# Patient Record
Sex: FEMALE | Race: WHITE | NOT HISPANIC OR LATINO
[De-identification: names, ages, dates, MRNs, and addresses within clinical notes are randomized per-mention and may not be internally consistent; named-entity substitution may affect disease eponyms.]

---

## 2017-02-15 ENCOUNTER — APPOINTMENT (OUTPATIENT)
Dept: HEMATOLOGY ONCOLOGY | Facility: CLINIC | Age: 67
End: 2017-02-15

## 2017-02-15 ENCOUNTER — OUTPATIENT (OUTPATIENT)
Dept: OUTPATIENT SERVICES | Facility: HOSPITAL | Age: 67
LOS: 1 days | Discharge: HOME | End: 2017-02-15

## 2017-02-15 VITALS
WEIGHT: 154 LBS | HEIGHT: 59 IN | SYSTOLIC BLOOD PRESSURE: 147 MMHG | TEMPERATURE: 97.6 F | HEART RATE: 74 BPM | RESPIRATION RATE: 14 BRPM | BODY MASS INDEX: 31.04 KG/M2 | DIASTOLIC BLOOD PRESSURE: 79 MMHG

## 2017-03-13 ENCOUNTER — RECORD ABSTRACTING (OUTPATIENT)
Age: 67
End: 2017-03-13

## 2017-03-13 DIAGNOSIS — Z86.018 PERSONAL HISTORY OF OTHER BENIGN NEOPLASM: ICD-10-CM

## 2017-03-13 DIAGNOSIS — Z86.79 PERSONAL HISTORY OF OTHER DISEASES OF THE CIRCULATORY SYSTEM: ICD-10-CM

## 2017-03-13 DIAGNOSIS — Z80.3 FAMILY HISTORY OF MALIGNANT NEOPLASM OF BREAST: ICD-10-CM

## 2017-03-13 DIAGNOSIS — Z87.891 PERSONAL HISTORY OF NICOTINE DEPENDENCE: ICD-10-CM

## 2017-03-13 DIAGNOSIS — Z92.89 PERSONAL HISTORY OF OTHER MEDICAL TREATMENT: ICD-10-CM

## 2017-03-20 LAB
25(OH)D3 SERPL-MCNC: 16 NG/ML
ALBUMIN SERPL-MCNC: 4.5 G/DL
ALBUMIN/GLOB SERPL: 1.88
ALP SERPL-CCNC: 82 IU/L
ALT SERPL-CCNC: 29 IU/L
ANION GAP SERPL CALC-SCNC: 9 MEQ/L
AST SERPL-CCNC: 21 IU/L
BASOPHILS # BLD: 0.05 TH/MM3
BASOPHILS NFR BLD: 0.9 %
BILIRUB SERPL-MCNC: 0.5 MG/DL
BUN SERPL-MCNC: 14 MG/DL
BUN/CREAT SERPL: 28 %
CALCIUM SERPL-MCNC: 9.8 MG/DL
CHLORIDE SERPL-SCNC: 105 MEQ/L
CO2 SERPL-SCNC: 28 MEQ/L
CREAT SERPL-MCNC: 0.5 MG/DL
EOSINOPHIL # BLD: 0.28 TH/MM3
EOSINOPHIL NFR BLD: 4.9 %
ERYTHROCYTE [DISTWIDTH] IN BLOOD BY AUTOMATED COUNT: 14.6 %
GFR SERPL CREATININE-BSD FRML MDRD: 123
GLUCOSE SERPL-MCNC: 63 MG/DL
GRANULOCYTES # BLD: 2.77 TH/MM3
GRANULOCYTES NFR BLD: 48.7 %
HCT VFR BLD AUTO: 35.8 %
HGB BLD-MCNC: 11.6 G/DL
IMM GRANULOCYTES # BLD: 0.01 TH/MM3
IMM GRANULOCYTES NFR BLD: 0.2 %
LYMPHOCYTES # BLD: 2.13 TH/MM3
LYMPHOCYTES NFR BLD: 37.4 %
MCH RBC QN AUTO: 27.6 PG
MCHC RBC AUTO-ENTMCNC: 32.4 G/DL
MCV RBC AUTO: 85 FL
MONOCYTES # BLD: 0.45 TH/MM3
MONOCYTES NFR BLD: 7.9 %
PLATELET # BLD: 229 TH/MM3
PMV BLD AUTO: 9.1 FL
POTASSIUM SERPL-SCNC: 4.6 MMOL/L
PROT SERPL-MCNC: 6.9 G/DL
RBC # BLD AUTO: 4.21 MIL/MM3
SODIUM SERPL-SCNC: 142 MEQ/L
VITAMIN D2 SERPL-MCNC: <4 NG/ML
VITAMIN D3 SERPL-MCNC: 16 NG/ML
WBC # BLD: 5.69 TH/MM3

## 2017-03-21 ENCOUNTER — APPOINTMENT (OUTPATIENT)
Dept: BREAST CENTER | Facility: CLINIC | Age: 67
End: 2017-03-21

## 2017-03-21 VITALS
WEIGHT: 150 LBS | SYSTOLIC BLOOD PRESSURE: 136 MMHG | DIASTOLIC BLOOD PRESSURE: 82 MMHG | BODY MASS INDEX: 30.24 KG/M2 | HEIGHT: 59 IN

## 2017-03-21 RX ORDER — ANASTROZOLE 1 MG/1
1 TABLET ORAL
Refills: 0 | Status: ACTIVE | COMMUNITY

## 2017-03-21 RX ORDER — OMEPRAZOLE 40 MG/1
40 CAPSULE, DELAYED RELEASE ORAL
Refills: 0 | Status: ACTIVE | COMMUNITY

## 2017-03-21 RX ORDER — AMLODIPINE AND VALSARTAN 5; 160 MG/1; MG/1
5-160 TABLET, FILM COATED ORAL
Refills: 0 | Status: ACTIVE | COMMUNITY

## 2017-03-21 RX ORDER — VALSARTAN 160 MG/1
160 TABLET, COATED ORAL
Refills: 0 | Status: ACTIVE | COMMUNITY

## 2017-03-21 RX ORDER — CETIRIZINE HYDROCHLORIDE 10 MG/1
10 CAPSULE, LIQUID FILLED ORAL
Refills: 0 | Status: ACTIVE | COMMUNITY

## 2017-04-19 ENCOUNTER — RX RENEWAL (OUTPATIENT)
Age: 67
End: 2017-04-19

## 2017-06-27 DIAGNOSIS — C50.811 MALIGNANT NEOPLASM OF OVERLAPPING SITES OF RIGHT FEMALE BREAST: ICD-10-CM

## 2017-07-13 ENCOUNTER — OUTPATIENT (OUTPATIENT)
Dept: OUTPATIENT SERVICES | Facility: HOSPITAL | Age: 67
LOS: 1 days | Discharge: HOME | End: 2017-07-13

## 2017-07-13 DIAGNOSIS — Z01.419 ENCOUNTER FOR GYNECOLOGICAL EXAMINATION (GENERAL) (ROUTINE) WITHOUT ABNORMAL FINDINGS: ICD-10-CM

## 2017-08-14 ENCOUNTER — APPOINTMENT (OUTPATIENT)
Dept: HEMATOLOGY ONCOLOGY | Facility: CLINIC | Age: 67
End: 2017-08-14

## 2017-08-14 ENCOUNTER — OUTPATIENT (OUTPATIENT)
Dept: OUTPATIENT SERVICES | Facility: HOSPITAL | Age: 67
LOS: 1 days | Discharge: HOME | End: 2017-08-14

## 2017-08-14 VITALS
SYSTOLIC BLOOD PRESSURE: 154 MMHG | RESPIRATION RATE: 14 BRPM | WEIGHT: 159 LBS | HEIGHT: 59 IN | BODY MASS INDEX: 32.05 KG/M2 | DIASTOLIC BLOOD PRESSURE: 79 MMHG | HEART RATE: 77 BPM | TEMPERATURE: 97.4 F

## 2017-08-15 DIAGNOSIS — C50.811 MALIGNANT NEOPLASM OF OVERLAPPING SITES OF RIGHT FEMALE BREAST: ICD-10-CM

## 2017-08-28 ENCOUNTER — OUTPATIENT (OUTPATIENT)
Dept: OUTPATIENT SERVICES | Facility: HOSPITAL | Age: 67
LOS: 1 days | Discharge: HOME | End: 2017-08-28

## 2017-08-28 DIAGNOSIS — Z12.31 ENCOUNTER FOR SCREENING MAMMOGRAM FOR MALIGNANT NEOPLASM OF BREAST: ICD-10-CM

## 2017-10-12 ENCOUNTER — APPOINTMENT (OUTPATIENT)
Dept: BREAST CENTER | Facility: CLINIC | Age: 67
End: 2017-10-12
Payer: MEDICARE

## 2017-10-12 VITALS
WEIGHT: 160 LBS | HEIGHT: 59 IN | DIASTOLIC BLOOD PRESSURE: 90 MMHG | OXYGEN SATURATION: 97 % | SYSTOLIC BLOOD PRESSURE: 160 MMHG | BODY MASS INDEX: 32.25 KG/M2 | HEART RATE: 54 BPM

## 2017-10-12 PROCEDURE — 99213 OFFICE O/P EST LOW 20 MIN: CPT

## 2017-10-13 ENCOUNTER — RX RENEWAL (OUTPATIENT)
Age: 67
End: 2017-10-13

## 2017-10-13 RX ORDER — ANASTROZOLE TABLETS 1 MG/1
1 TABLET ORAL DAILY
Qty: 90 | Refills: 1 | Status: ACTIVE | COMMUNITY
Start: 2017-04-19 | End: 1900-01-01

## 2018-02-12 ENCOUNTER — OUTPATIENT (OUTPATIENT)
Dept: OUTPATIENT SERVICES | Facility: HOSPITAL | Age: 68
LOS: 1 days | Discharge: HOME | End: 2018-02-12

## 2018-02-12 ENCOUNTER — APPOINTMENT (OUTPATIENT)
Dept: HEMATOLOGY ONCOLOGY | Facility: CLINIC | Age: 68
End: 2018-02-12

## 2018-02-12 VITALS
WEIGHT: 160 LBS | TEMPERATURE: 96.3 F | HEART RATE: 71 BPM | HEIGHT: 59 IN | DIASTOLIC BLOOD PRESSURE: 78 MMHG | BODY MASS INDEX: 32.25 KG/M2 | SYSTOLIC BLOOD PRESSURE: 148 MMHG | RESPIRATION RATE: 16 BRPM

## 2018-02-13 DIAGNOSIS — C50.811 MALIGNANT NEOPLASM OF OVERLAPPING SITES OF RIGHT FEMALE BREAST: ICD-10-CM

## 2018-03-20 LAB
ANION GAP SERPL CALC-SCNC: 14 MMOL/L
BUN SERPL-MCNC: 22 MG/DL
CALCIUM SERPL-MCNC: 9.4 MG/DL
CHLORIDE SERPL-SCNC: 103 MMOL/L
CO2 SERPL-SCNC: 25 MMOL/L
CREAT SERPL-MCNC: 0.6 MG/DL
GLUCOSE SERPL-MCNC: 101 MG/DL
POTASSIUM SERPL-SCNC: 4.2 MMOL/L
SODIUM SERPL-SCNC: 142 MMOL/L

## 2018-03-26 ENCOUNTER — OUTPATIENT (OUTPATIENT)
Dept: OUTPATIENT SERVICES | Facility: HOSPITAL | Age: 68
LOS: 1 days | Discharge: HOME | End: 2018-03-26

## 2018-03-26 DIAGNOSIS — C50.919 MALIGNANT NEOPLASM OF UNSPECIFIED SITE OF UNSPECIFIED FEMALE BREAST: ICD-10-CM

## 2018-04-03 DIAGNOSIS — C50.911 MALIGNANT NEOPLASM OF UNSPECIFIED SITE OF RIGHT FEMALE BREAST: ICD-10-CM

## 2018-04-03 DIAGNOSIS — Z85.3 PERSONAL HISTORY OF MALIGNANT NEOPLASM OF BREAST: ICD-10-CM

## 2018-04-26 ENCOUNTER — APPOINTMENT (OUTPATIENT)
Dept: BREAST CENTER | Facility: CLINIC | Age: 68
End: 2018-04-26
Payer: MEDICARE

## 2018-04-26 VITALS
DIASTOLIC BLOOD PRESSURE: 92 MMHG | WEIGHT: 160 LBS | BODY MASS INDEX: 32.25 KG/M2 | HEART RATE: 81 BPM | HEIGHT: 59 IN | SYSTOLIC BLOOD PRESSURE: 134 MMHG | OXYGEN SATURATION: 96 %

## 2018-04-26 PROCEDURE — 99213 OFFICE O/P EST LOW 20 MIN: CPT

## 2018-08-13 ENCOUNTER — APPOINTMENT (OUTPATIENT)
Dept: HEMATOLOGY ONCOLOGY | Facility: CLINIC | Age: 68
End: 2018-08-13

## 2018-09-07 ENCOUNTER — FORM ENCOUNTER (OUTPATIENT)
Age: 68
End: 2018-09-07

## 2018-09-08 ENCOUNTER — OUTPATIENT (OUTPATIENT)
Dept: OUTPATIENT SERVICES | Facility: HOSPITAL | Age: 68
LOS: 1 days | Discharge: HOME | End: 2018-09-08

## 2018-09-08 DIAGNOSIS — Z12.31 ENCOUNTER FOR SCREENING MAMMOGRAM FOR MALIGNANT NEOPLASM OF BREAST: ICD-10-CM

## 2018-09-13 ENCOUNTER — APPOINTMENT (OUTPATIENT)
Dept: BREAST CENTER | Facility: CLINIC | Age: 68
End: 2018-09-13
Payer: MEDICARE

## 2018-09-13 VITALS
WEIGHT: 160 LBS | HEIGHT: 59 IN | SYSTOLIC BLOOD PRESSURE: 126 MMHG | OXYGEN SATURATION: 95 % | BODY MASS INDEX: 32.25 KG/M2 | DIASTOLIC BLOOD PRESSURE: 80 MMHG | HEART RATE: 79 BPM

## 2018-09-13 PROCEDURE — 99213 OFFICE O/P EST LOW 20 MIN: CPT

## 2018-09-20 ENCOUNTER — OUTPATIENT (OUTPATIENT)
Dept: OUTPATIENT SERVICES | Facility: HOSPITAL | Age: 68
LOS: 1 days | Discharge: HOME | End: 2018-09-20

## 2018-09-20 VITALS — DIASTOLIC BLOOD PRESSURE: 91 MMHG | RESPIRATION RATE: 15 BRPM | HEART RATE: 85 BPM | SYSTOLIC BLOOD PRESSURE: 119 MMHG

## 2018-09-20 VITALS
HEIGHT: 59 IN | SYSTOLIC BLOOD PRESSURE: 157 MMHG | TEMPERATURE: 97 F | DIASTOLIC BLOOD PRESSURE: 83 MMHG | RESPIRATION RATE: 18 BRPM | HEART RATE: 81 BPM | WEIGHT: 156.09 LBS

## 2018-09-20 DIAGNOSIS — Z98.890 OTHER SPECIFIED POSTPROCEDURAL STATES: Chronic | ICD-10-CM

## 2018-09-20 NOTE — PRE-ANESTHESIA EVALUATION ADULT - NSANTHOSAYNRD_GEN_A_CORE
No. CHEIKH screening performed.  STOP BANG Legend: 0-2 = LOW Risk; 3-4 = INTERMEDIATE Risk; 5-8 = HIGH Risk

## 2018-09-24 DIAGNOSIS — E03.9 HYPOTHYROIDISM, UNSPECIFIED: ICD-10-CM

## 2018-09-24 DIAGNOSIS — H25.89 OTHER AGE-RELATED CATARACT: ICD-10-CM

## 2018-09-24 DIAGNOSIS — E78.00 PURE HYPERCHOLESTEROLEMIA, UNSPECIFIED: ICD-10-CM

## 2018-09-24 DIAGNOSIS — I10 ESSENTIAL (PRIMARY) HYPERTENSION: ICD-10-CM

## 2018-09-27 ENCOUNTER — OUTPATIENT (OUTPATIENT)
Dept: OUTPATIENT SERVICES | Facility: HOSPITAL | Age: 68
LOS: 1 days | Discharge: HOME | End: 2018-09-27

## 2018-09-27 VITALS
DIASTOLIC BLOOD PRESSURE: 77 MMHG | HEART RATE: 77 BPM | TEMPERATURE: 97 F | OXYGEN SATURATION: 98 % | WEIGHT: 154.98 LBS | HEIGHT: 60 IN | RESPIRATION RATE: 17 BRPM | SYSTOLIC BLOOD PRESSURE: 143 MMHG

## 2018-09-27 VITALS — HEART RATE: 78 BPM | DIASTOLIC BLOOD PRESSURE: 74 MMHG | SYSTOLIC BLOOD PRESSURE: 125 MMHG

## 2018-09-27 DIAGNOSIS — Z96.1 PRESENCE OF INTRAOCULAR LENS: Chronic | ICD-10-CM

## 2018-09-27 DIAGNOSIS — Z98.890 OTHER SPECIFIED POSTPROCEDURAL STATES: Chronic | ICD-10-CM

## 2018-09-27 RX ORDER — LORATADINE 10 MG/1
1 TABLET ORAL
Qty: 0 | Refills: 0 | COMMUNITY

## 2018-09-27 RX ORDER — SIMVASTATIN 20 MG/1
1 TABLET, FILM COATED ORAL
Qty: 0 | Refills: 0 | COMMUNITY

## 2018-09-27 RX ORDER — AMLODIPINE BESYLATE 2.5 MG/1
1 TABLET ORAL
Qty: 0 | Refills: 0 | COMMUNITY

## 2018-09-27 RX ORDER — SODIUM CHLORIDE 9 MG/ML
1000 INJECTION, SOLUTION INTRAVENOUS
Qty: 0 | Refills: 0 | Status: DISCONTINUED | OUTPATIENT
Start: 2018-09-27 | End: 2018-10-12

## 2018-09-27 RX ORDER — ACETAMINOPHEN 500 MG
650 TABLET ORAL ONCE
Qty: 0 | Refills: 0 | Status: DISCONTINUED | OUTPATIENT
Start: 2018-09-27 | End: 2018-10-12

## 2018-09-27 RX ORDER — VALSARTAN 80 MG/1
1 TABLET ORAL
Qty: 0 | Refills: 0 | COMMUNITY

## 2018-09-27 RX ORDER — OMEPRAZOLE 10 MG/1
1 CAPSULE, DELAYED RELEASE ORAL
Qty: 0 | Refills: 0 | COMMUNITY

## 2018-09-27 RX ORDER — ONDANSETRON 8 MG/1
4 TABLET, FILM COATED ORAL ONCE
Qty: 0 | Refills: 0 | Status: DISCONTINUED | OUTPATIENT
Start: 2018-09-27 | End: 2018-10-12

## 2018-09-27 RX ORDER — LEVOTHYROXINE SODIUM 125 MCG
0 TABLET ORAL
Qty: 0 | Refills: 0 | COMMUNITY

## 2018-09-27 NOTE — ASU DISCHARGE PLAN (ADULT/PEDIATRIC). - NURSING INSTRUCTIONS
verbal and written instructions given to pt and spouse with good understanding, follow up appointment scheduled for 9/28 at 9am

## 2018-10-01 DIAGNOSIS — E78.00 PURE HYPERCHOLESTEROLEMIA, UNSPECIFIED: ICD-10-CM

## 2018-10-01 DIAGNOSIS — H26.9 UNSPECIFIED CATARACT: ICD-10-CM

## 2018-10-01 DIAGNOSIS — I10 ESSENTIAL (PRIMARY) HYPERTENSION: ICD-10-CM

## 2019-03-15 ENCOUNTER — TRANSCRIPTION ENCOUNTER (OUTPATIENT)
Age: 69
End: 2019-03-15

## 2019-03-19 ENCOUNTER — APPOINTMENT (OUTPATIENT)
Dept: BREAST CENTER | Facility: CLINIC | Age: 69
End: 2019-03-19
Payer: MEDICARE

## 2019-03-19 VITALS
HEIGHT: 59 IN | DIASTOLIC BLOOD PRESSURE: 74 MMHG | WEIGHT: 154 LBS | SYSTOLIC BLOOD PRESSURE: 124 MMHG | TEMPERATURE: 98.7 F | BODY MASS INDEX: 31.04 KG/M2

## 2019-03-19 PROBLEM — I10 ESSENTIAL (PRIMARY) HYPERTENSION: Chronic | Status: ACTIVE | Noted: 2018-09-20

## 2019-03-19 PROBLEM — E78.00 PURE HYPERCHOLESTEROLEMIA, UNSPECIFIED: Chronic | Status: ACTIVE | Noted: 2018-09-20

## 2019-03-19 PROBLEM — K21.9 GASTRO-ESOPHAGEAL REFLUX DISEASE WITHOUT ESOPHAGITIS: Chronic | Status: ACTIVE | Noted: 2018-09-20

## 2019-03-19 PROBLEM — E03.9 HYPOTHYROIDISM, UNSPECIFIED: Chronic | Status: ACTIVE | Noted: 2018-09-20

## 2019-03-19 PROCEDURE — 99213 OFFICE O/P EST LOW 20 MIN: CPT

## 2019-03-19 NOTE — PHYSICAL EXAM
[Normocephalic] : normocephalic [Atraumatic] : atraumatic [No dominant masses] : no dominant masses in right breast  [No dominant masses] : no dominant masses left breast [No Nipple Discharge] : no left nipple discharge [Breast Nipple Inversion] : nipples not inverted [Breast Nipple Retraction] : nipples not retracted [No Axillary Lymphadenopathy] : no left axillary lymphadenopathy [No Rashes] : no rashes [No Ulceration] : no ulceration [de-identified] : scattered bilateral fibroglandular densities.  [de-identified] : well healed surgical scars.

## 2019-03-19 NOTE — DATA REVIEWED
[FreeTextEntry1] : B/L Screening Mammogram - 09/08/18:\par MAMMOGRAM FINDINGS: \par The following mammographic views were obtained: bilateral craniocaudal with \par tomosynthesis, bilateral mediolateral oblique with tomosynthesis, and right \par craniocaudal exaggerated laterally. Computer-aided detection was utilized \par in the interpretation of this examination. \par There are scattered areas of fibroglandular density. \par There is an area of architectural distortion at the site of lumpectomy, \par consistent with postoperative fibrosis seen in the right breast. \par No suspicious masses, calcifications or other abnormalities are seen. \par IMPRESSION: \par There is no mammographic evidence of malignancy. \par RECOMMENDATION: \par Unless otherwise indicated by clinical findings, annual screening \par mammography recommended. \par ASSESSMENT: \par BI-RADS Category 2: Benign

## 2019-03-19 NOTE — HISTORY OF PRESENT ILLNESS
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  \par Oncotype DX RS 18.  \par (+) Whole breast RT.  \par no chemo, on Arimidex - Dr. Rosa.\par \par RADHA VANCE is a 68 year old female patient who presents today in follow up for history of \par right breast cancer.\par Since her last visit, she has no new breast related complaints. \par Imaging was done on 09/08/18, which revealed no mammographic evidence of malignancy.\par \par She presents today for evaluation.

## 2019-03-19 NOTE — ASSESSMENT
[FreeTextEntry1] : RADHA is a ramila 68 year old patient who presented today in follow up for a history of right breast cancer. \par She has been doing well with no new breast related complaints. \par Imaging was done in September 2018 which revealed no mammographic evidence of malignancy, \par as detailed above. \par Physical exam was unrevealing today.\par \par Pt has completed adjuvant hormonal therapy as of October 2017. \par \par We had a lengthy discussion regarding screening bilateral breast MRIs. Pt prefers to continue only with annual screening mammograms with the addition of sonogram.  I think this is a reasonable decision.\par Therefore, she will be due for bilateral screening mammogram and sonogram in September 2019, and that will be scheduled today. \par All questions answered. She knows to call with any concerns. \par She will return for follow-up and clinical breast exam in six months.\par \par RADHA VANCE has been enrolled in the breast cancer surgical survivorship care program.\par A copy of her survivorship care plan has been provided to her as well.

## 2019-03-19 NOTE — PAST MEDICAL HISTORY
[Surgical Menopause] : The patient is in surgical menopause [Menarche Age ____] : age at menarche was [unfilled] [Menopause Age____] : age at menopause was [unfilled] [History of Hormone Replacement Treatment] : has a history of hormone replacement treatment [Total Preg ___] : G[unfilled] [Live Births ___] : P[unfilled]  [Age At Live Birth ___] : Age at live birth: [unfilled] [FreeTextEntry7] : Yes. [FreeTextEntry8] : No.

## 2019-09-10 ENCOUNTER — FORM ENCOUNTER (OUTPATIENT)
Age: 69
End: 2019-09-10

## 2019-09-11 ENCOUNTER — OUTPATIENT (OUTPATIENT)
Dept: OUTPATIENT SERVICES | Facility: HOSPITAL | Age: 69
LOS: 1 days | Discharge: HOME | End: 2019-09-11
Payer: MEDICARE

## 2019-09-11 DIAGNOSIS — Z85.3 PERSONAL HISTORY OF MALIGNANT NEOPLASM OF BREAST: ICD-10-CM

## 2019-09-11 DIAGNOSIS — Z12.31 ENCOUNTER FOR SCREENING MAMMOGRAM FOR MALIGNANT NEOPLASM OF BREAST: ICD-10-CM

## 2019-09-11 DIAGNOSIS — Z96.1 PRESENCE OF INTRAOCULAR LENS: Chronic | ICD-10-CM

## 2019-09-11 DIAGNOSIS — Z98.890 OTHER SPECIFIED POSTPROCEDURAL STATES: Chronic | ICD-10-CM

## 2019-09-11 PROCEDURE — 77063 BREAST TOMOSYNTHESIS BI: CPT | Mod: 26

## 2019-09-11 PROCEDURE — 76641 ULTRASOUND BREAST COMPLETE: CPT | Mod: 26,50

## 2019-09-11 PROCEDURE — 77067 SCR MAMMO BI INCL CAD: CPT | Mod: 26

## 2019-09-25 ENCOUNTER — FORM ENCOUNTER (OUTPATIENT)
Age: 69
End: 2019-09-25

## 2019-09-26 ENCOUNTER — OUTPATIENT (OUTPATIENT)
Dept: OUTPATIENT SERVICES | Facility: HOSPITAL | Age: 69
LOS: 1 days | Discharge: HOME | End: 2019-09-26
Payer: MEDICARE

## 2019-09-26 ENCOUNTER — RESULT REVIEW (OUTPATIENT)
Age: 69
End: 2019-09-26

## 2019-09-26 DIAGNOSIS — Z98.890 OTHER SPECIFIED POSTPROCEDURAL STATES: Chronic | ICD-10-CM

## 2019-09-26 DIAGNOSIS — Z96.1 PRESENCE OF INTRAOCULAR LENS: Chronic | ICD-10-CM

## 2019-09-26 PROCEDURE — 88305 TISSUE EXAM BY PATHOLOGIST: CPT | Mod: 26

## 2019-09-26 PROCEDURE — 19084 BX BREAST ADD LESION US IMAG: CPT | Mod: LT

## 2019-09-26 PROCEDURE — 77065 DX MAMMO INCL CAD UNI: CPT | Mod: 26,LT

## 2019-09-26 PROCEDURE — 19083 BX BREAST 1ST LESION US IMAG: CPT | Mod: LT

## 2019-09-27 LAB — SURGICAL PATHOLOGY STUDY: SIGNIFICANT CHANGE UP

## 2019-10-02 DIAGNOSIS — D24.2 BENIGN NEOPLASM OF LEFT BREAST: ICD-10-CM

## 2019-10-02 DIAGNOSIS — N60.32 FIBROSCLEROSIS OF LEFT BREAST: ICD-10-CM

## 2019-10-02 DIAGNOSIS — N60.22 FIBROADENOSIS OF LEFT BREAST: ICD-10-CM

## 2019-10-02 DIAGNOSIS — N63.20 UNSPECIFIED LUMP IN THE LEFT BREAST, UNSPECIFIED QUADRANT: ICD-10-CM

## 2019-10-02 DIAGNOSIS — R92.0 MAMMOGRAPHIC MICROCALCIFICATION FOUND ON DIAGNOSTIC IMAGING OF BREAST: ICD-10-CM

## 2019-10-02 DIAGNOSIS — N62 HYPERTROPHY OF BREAST: ICD-10-CM

## 2019-10-10 ENCOUNTER — APPOINTMENT (OUTPATIENT)
Dept: BREAST CENTER | Facility: CLINIC | Age: 69
End: 2019-10-10
Payer: MEDICARE

## 2019-10-10 VITALS
BODY MASS INDEX: 31.04 KG/M2 | HEIGHT: 59 IN | SYSTOLIC BLOOD PRESSURE: 126 MMHG | TEMPERATURE: 98.7 F | DIASTOLIC BLOOD PRESSURE: 70 MMHG | WEIGHT: 154 LBS

## 2019-10-10 PROCEDURE — 99213 OFFICE O/P EST LOW 20 MIN: CPT

## 2019-10-10 NOTE — HISTORY OF PRESENT ILLNESS
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  \par Oncotype DX RS 18.  \par (+) Whole breast RT.  \par no chemo, completed Arimidex - Dr. Rosa.\par s/p US Guided Core Bx - 09/26/19:\par Left 1-2:00 N8, 6mm & Left 1-2:00 N8, 9mm - HYALINIZING FIBROADENOMA. \par \par RADHA VANCE is a 68 year old female patient who presents today in follow up for history of \par right breast cancer.\par Since her last visit, she has no new breast related complaints. \par Imaging was done on 09/11/19, which revealed two hypoechoic masses in the left breast for which ultrasound guided core biopsy was recommended.\par BIopsies were performed on 09/26/19; pathology showed hyalinizing fibroadenomas - benign concordant histology.\par \par She presents today for evaluation with imaging and pathology review..

## 2019-10-10 NOTE — ASSESSMENT
[FreeTextEntry1] : RADHA is a ramila 68 year old patient who presented today in follow up for a history of right breast cancer. \par She has been doing well with no new breast related complaints. \par Imaging was done on 09/11/19, which revealed two hypoechoic masses in the left breast for which ultrasound guided core biopsy was recommended.\par BIopsies were performed on 09/26/19; pathology showed hyalinizing fibroadenomas - benign concordant histology, as detailed above. \par Physical exam was unrevealing today.\par \par As follow-up to recent biopsy, she will be due for a left breast sonogram in April 2020, \par and that will be scheduled today. \par All questions answered. She knows to call with any concerns. \par She will return for follow-up and clinical breast exam in six months.\par \par RADHA VANCE has been enrolled in the breast cancer surgical survivorship care program.\par A copy of her survivorship care plan has been provided to her as well.

## 2019-10-10 NOTE — PAST MEDICAL HISTORY
[Surgical Menopause] : The patient is in surgical menopause [Menarche Age ____] : age at menarche was [unfilled] [History of Hormone Replacement Treatment] : has a history of hormone replacement treatment [Menopause Age____] : age at menopause was [unfilled] [Total Preg ___] : G[unfilled] [Live Births ___] : P[unfilled]  [Age At Live Birth ___] : Age at live birth: [unfilled] [FreeTextEntry7] : Yes. [FreeTextEntry8] : No.

## 2019-10-10 NOTE — DATA REVIEWED
[FreeTextEntry1] : B/L Screening Mammogram - 09/11/19:\par MAMMOGRAM FINDINGS: \par MAMMOGRAM FINDINGS: \par Mammography was performed including the following views: bilateral craniocaudal with tomosynthesis, bilateral mediolateral oblique with tomosynthesis. The examination includes digital synthetic 2D and digital tomosynthesis 3D images. Additional imaging analysis was performed using CAD (computer-aided detection) software. \par There are scattered areas of fibroglandular density. \par There is an area of architectural distortion at the site of lumpectomy seen in the right breast. \par No suspicious mass, grouping of calcifications, or other abnormality is identified. \par IMPRESSION: \par There is no mammographic evidence of malignancy. \par RECOMMENDATION: \par Unless otherwise indicated by clinical findings, annual screening mammography recommended. \par ASSESSMENT: \par BI-RADS Category 2: Benign\par \par B/L Breast Sono - 09/11/19:\par Bilateral whole breast sonography was performed. \par Findings: No suspicious solid or cystic lesion is seen in the right breast \par In the left breast at 1-2 o'clock 8 cm from the nipple, there are 2 hypoechoic ovoid masses measuring 6 x 6 x 3 mm and 9 x 9 x 4 mm. These are approximately 2 cm apart. These are mammographically occult. In light of patient's history, ultrasound-guided biopsy of both lesions is recommended. \par Impression: 2 hypoechoic masses in the left breast are suspicious. Ultrasound-guided biopsy of both of these is recommended. \par Recommendation: Ultrasound-guided biopsy x2. \par BI-RADS Category 4: Suspicious\par \par US Guided Core Bx - 09/26/19:\par Left 1-2:00 N8, 6mm\par Left 1-2:00 N8, 9mm\par - HYALINIZING FIBROADENOMA. \par - BENIGN ADJACENT BREAST TISSUE WITH PROLIFERATIVE TYPE \par FIBROCYSTIC CHANGES INCLUDING USUAL TYPE DUCT HYPERPLASIA, \par STROMAL FIBROSIS, SCLEROSING ADENOSIS, APOCRINE METAPLASIA, AND \par MICROCALCIFICATIONS.. \par Benign concordant histology.

## 2019-10-10 NOTE — PHYSICAL EXAM
[Normocephalic] : normocephalic [Atraumatic] : atraumatic [No dominant masses] : no dominant masses in right breast  [No dominant masses] : no dominant masses left breast [No Nipple Discharge] : no left nipple discharge [No Ulceration] : no ulceration [No Rashes] : no rashes [Breast Nipple Inversion] : nipples not inverted [Breast Nipple Retraction] : nipples not retracted [de-identified] : scattered bilateral fibroglandular densities.  [de-identified] : well healed surgical scars. [de-identified] : small healing ecchymotic area secondary to recent bx. [de-identified] : No axillary lymphadenopathy appreciated. [de-identified] : No axillary lymphadenopathy appreciated.

## 2019-11-01 ENCOUNTER — APPOINTMENT (OUTPATIENT)
Dept: HEMATOLOGY ONCOLOGY | Facility: CLINIC | Age: 69
End: 2019-11-01
Payer: MEDICARE

## 2019-11-01 ENCOUNTER — OUTPATIENT (OUTPATIENT)
Dept: OUTPATIENT SERVICES | Facility: HOSPITAL | Age: 69
LOS: 1 days | Discharge: HOME | End: 2019-11-01

## 2019-11-01 VITALS
BODY MASS INDEX: 32.25 KG/M2 | DIASTOLIC BLOOD PRESSURE: 73 MMHG | HEIGHT: 59 IN | WEIGHT: 160 LBS | HEART RATE: 78 BPM | SYSTOLIC BLOOD PRESSURE: 154 MMHG | TEMPERATURE: 98.1 F

## 2019-11-01 DIAGNOSIS — Z96.1 PRESENCE OF INTRAOCULAR LENS: Chronic | ICD-10-CM

## 2019-11-01 DIAGNOSIS — Z98.890 OTHER SPECIFIED POSTPROCEDURAL STATES: Chronic | ICD-10-CM

## 2019-11-01 PROCEDURE — 99213 OFFICE O/P EST LOW 20 MIN: CPT

## 2019-11-02 NOTE — ASSESSMENT
[FreeTextEntry1] : # History of stage IB, ER/VT positive and HER2/lloyd negative rightsided breast cancer, status post lumpectomy and sentinel lymph node biopsy, on adjuvant breast radiotherapy, completed adjuvant endocrine therapy on 2017 There is no clinical evidence of recurrent disease.\par \par PLAN:  \par - The patient will remain on observation \par - She is due for left breast sono April 2020 , and follow up with Dr Villatoro\par - RTO in 1 year  \par \par Patient seen and examined by Dr Rosa who agree for above plan. \par

## 2019-11-02 NOTE — HISTORY OF PRESENT ILLNESS
[de-identified] : HISTORY OF PRESENT ILLNESS:  This is a 66yearold white female, who is here today for a sixmonth followup visit.  She has history of stage IB (pT1c N0 M0), ER/[place] positive and HER2/lloyd negative invasive ductal carcinoma of the right breast, status post lumpectomy and sentinel lymph node biopsy.  Her Oncotype recurrence score was 18.  She is likely to have adjuvant endocrine therapy alone.  She started Arimidex in 11/2012, which she is tolerating well.  She has enough refills until October 2017. (5 years total on Arimidex).  She also received a course of adjuvant breast radiotherapy between 09/2012 and 10/2012.  Her latest mammogram was done in 08/2016, which showed no mammographic evidence of malignancy. Her next mammogram appt is on 8/28/17.  She has a follow up appt with Dr. Villatoro in Sept 2017. She also had an MRI of the bilateral breast on 06/06/2016, which shows no suspicious enhancing mass or non mass enhancement in either breast.  She opted not to start the Prolia injection due to multiple dental issues.  She had recent gum surgery on her right lower molars one week ago,  Her last bone density was done on 03/11/2017, which showed osteopenia, which was done at Dr. Carreon's office. Previous bone density from 2010, 2014 were reviewed as well.  t score from -0.9 to -2.2 to -2.0.\par BCI results from 3/29/17  show low risk of late recurrence and low likelihood of benefit from extended endocrine therapy.\par Today , 2/12/18 patient reported  feeling good , no new complain . Patient completed Hormonal treatment in Oct, 2017 . Mammogram from 8/28/17 which is reveal NO mammographic evidence of malignancy  . patient will follow up with Dr Villatoro in 4/18/18 . \par Rest of review of systems is unremarkable.\par \par REVIEW OF SYSTEMS:  Unremarkable. [de-identified] : 11/1/2019: The patient is here for follow up . She is s/p right breast lumpectomy + SNLB , adjuvant RT and completed 5 years of anastrozole. Her last mammogram 9/11/2019 negative for malignancy, but her left breast sono showed 2 hypoechoic masses. Biopsy revealed fibroadenoma. The patient has no complaints.

## 2019-11-02 NOTE — PHYSICAL EXAM
[Fully active, able to carry on all pre-disease performance without restriction] : Status 0 - Fully active, able to carry on all pre-disease performance without restriction [Normal] : affect appropriate [de-identified] : Rt breast  Lumpectomy , no palpable mass or skin lesion.

## 2019-11-06 DIAGNOSIS — C50.511 MALIGNANT NEOPLASM OF LOWER-OUTER QUADRANT OF RIGHT FEMALE BREAST: ICD-10-CM

## 2019-12-03 ENCOUNTER — APPOINTMENT (OUTPATIENT)
Dept: OTOLARYNGOLOGY | Facility: CLINIC | Age: 69
End: 2019-12-03
Payer: MEDICARE

## 2019-12-03 DIAGNOSIS — H90.5 UNSPECIFIED SENSORINEURAL HEARING LOSS: ICD-10-CM

## 2019-12-03 DIAGNOSIS — H93.8X3 OTHER SPECIFIED DISORDERS OF EAR, BILATERAL: ICD-10-CM

## 2019-12-03 DIAGNOSIS — H69.81 OTHER SPECIFIED DISORDERS OF EUSTACHIAN TUBE, RIGHT EAR: ICD-10-CM

## 2019-12-03 PROCEDURE — 31231 NASAL ENDOSCOPY DX: CPT

## 2019-12-03 PROCEDURE — 92550 TYMPANOMETRY & REFLEX THRESH: CPT

## 2019-12-03 PROCEDURE — 99203 OFFICE O/P NEW LOW 30 MIN: CPT | Mod: 25

## 2019-12-03 PROCEDURE — 92557 COMPREHENSIVE HEARING TEST: CPT

## 2019-12-03 NOTE — PROCEDURE
[Topical Lidocaine] : topical lidocaine [Recalcitrant Symptoms] : recalcitrant symptoms  [Oxymetazoline HCl] : oxymetazoline HCl [Flexible Endoscope] : examined with the flexible endoscope [Congested] : congested [Normal] : the middle meatus had no abnormalities

## 2019-12-03 NOTE — REASON FOR VISIT
[Initial Evaluation] : an initial evaluation for [FreeTextEntry2] : fluid behind the tympanic membrane.

## 2019-12-03 NOTE — HISTORY OF PRESENT ILLNESS
[de-identified] : 69 year old patient is present today for fluid behind the tympanic membrane. patient admits for sinusitis 1 month ago, and was treated with antibiotics. gets 1 sinus infection a year. patient currently uses b/l hearing aids. Pt had difficulty hearing in both ears despite hearing aids. Pt has felt fullness that has gradually been improving. SHe is 90% improved.

## 2020-01-15 ENCOUNTER — APPOINTMENT (OUTPATIENT)
Dept: OTOLARYNGOLOGY | Facility: CLINIC | Age: 70
End: 2020-01-15

## 2020-08-10 ENCOUNTER — RESULT REVIEW (OUTPATIENT)
Age: 70
End: 2020-08-10

## 2020-08-10 ENCOUNTER — OUTPATIENT (OUTPATIENT)
Dept: OUTPATIENT SERVICES | Facility: HOSPITAL | Age: 70
LOS: 1 days | Discharge: HOME | End: 2020-08-10
Payer: MEDICARE

## 2020-08-10 DIAGNOSIS — Z98.890 OTHER SPECIFIED POSTPROCEDURAL STATES: Chronic | ICD-10-CM

## 2020-08-10 DIAGNOSIS — Z96.1 PRESENCE OF INTRAOCULAR LENS: Chronic | ICD-10-CM

## 2020-08-10 DIAGNOSIS — R92.8 OTHER ABNORMAL AND INCONCLUSIVE FINDINGS ON DIAGNOSTIC IMAGING OF BREAST: ICD-10-CM

## 2020-08-10 PROCEDURE — 76642 ULTRASOUND BREAST LIMITED: CPT | Mod: 26,LT

## 2020-08-25 ENCOUNTER — APPOINTMENT (OUTPATIENT)
Dept: BREAST CENTER | Facility: CLINIC | Age: 70
End: 2020-08-25

## 2020-10-02 ENCOUNTER — APPOINTMENT (OUTPATIENT)
Dept: HEMATOLOGY ONCOLOGY | Facility: CLINIC | Age: 70
End: 2020-10-02
Payer: MEDICARE

## 2020-10-02 ENCOUNTER — OUTPATIENT (OUTPATIENT)
Dept: OUTPATIENT SERVICES | Facility: HOSPITAL | Age: 70
LOS: 1 days | Discharge: HOME | End: 2020-10-02

## 2020-10-02 VITALS
BODY MASS INDEX: 31.85 KG/M2 | HEART RATE: 85 BPM | SYSTOLIC BLOOD PRESSURE: 158 MMHG | TEMPERATURE: 96.7 F | DIASTOLIC BLOOD PRESSURE: 81 MMHG | WEIGHT: 158 LBS | HEIGHT: 59 IN

## 2020-10-02 DIAGNOSIS — Z96.1 PRESENCE OF INTRAOCULAR LENS: Chronic | ICD-10-CM

## 2020-10-02 DIAGNOSIS — Z98.890 OTHER SPECIFIED POSTPROCEDURAL STATES: Chronic | ICD-10-CM

## 2020-10-02 PROCEDURE — 99213 OFFICE O/P EST LOW 20 MIN: CPT

## 2020-10-04 NOTE — PHYSICAL EXAM
[Fully active, able to carry on all pre-disease performance without restriction] : Status 0 - Fully active, able to carry on all pre-disease performance without restriction [Normal] : affect appropriate [de-identified] : Rt breast  Lumpectomy , no palpable mass or skin lesion.

## 2020-10-04 NOTE — HISTORY OF PRESENT ILLNESS
[de-identified] : HISTORY OF PRESENT ILLNESS:  This is a 66yearold white female, who is here today for a sixmonth followup visit.  She has history of stage IB (pT1c N0 M0), ER/[place] positive and HER2/lloyd negative invasive ductal carcinoma of the right breast, status post lumpectomy and sentinel lymph node biopsy.  Her Oncotype recurrence score was 18.  She is likely to have adjuvant endocrine therapy alone.  She started Arimidex in 11/2012, which she is tolerating well.  She has enough refills until October 2017. (5 years total on Arimidex).  She also received a course of adjuvant breast radiotherapy between 09/2012 and 10/2012.  Her latest mammogram was done in 08/2016, which showed no mammographic evidence of malignancy. Her next mammogram appt is on 8/28/17.  She has a follow up appt with Dr. Villatoro in Sept 2017. She also had an MRI of the bilateral breast on 06/06/2016, which shows no suspicious enhancing mass or non mass enhancement in either breast.  She opted not to start the Prolia injection due to multiple dental issues.  She had recent gum surgery on her right lower molars one week ago,  Her last bone density was done on 03/11/2017, which showed osteopenia, which was done at Dr. Carreon's office. Previous bone density from 2010, 2014 were reviewed as well.  t score from -0.9 to -2.2 to -2.0.\par BCI results from 3/29/17  show low risk of late recurrence and low likelihood of benefit from extended endocrine therapy.\par Today , 2/12/18 patient reported  feeling good , no new complain . Patient completed Hormonal treatment in Oct, 2017 . Mammogram from 8/28/17 which is reveal NO mammographic evidence of malignancy  . patient will follow up with Dr Villatoro in 4/18/18 . \par Rest of review of systems is unremarkable.\par \par REVIEW OF SYSTEMS:  Unremarkable. [de-identified] : 11/1/2019: The patient is here for follow up . She is s/p right breast lumpectomy + SNLB , adjuvant RT and completed 5 years of anastrozole. Her last mammogram 9/11/2019 negative for malignancy, but her left breast sono showed 2 hypoechoic masses. Biopsy revealed fibroadenoma. The patient has no complaints. \par \par 10/2/2020: The patient is here for follow up. Since last visit, she denies any new events. She had sonogram  of left breast 8/2020 for follow up on her fibroadenoma  which was negative. She remains on calcium and vitamin D

## 2020-10-04 NOTE — ASSESSMENT
[FreeTextEntry1] : # History of stage IB, ER/[place] positive and HER2/lloyd negative right sided breast cancer, status post lumpectomy and sentinel lymph node biopsy, on adjuvant breast radiotherapy, completed adjuvant endocrine therapy on 2017 There is no clinical evidence of recurrent disease. \par # Left breast fibroadenoma, last sonogram 8/2020\par \par PLAN:  \par - The patient will remain on observation . We will order repeat screening mammogram today\par - Follow with Dr Villatoro this month\par - RTO in 1 year  \par \par Patient seen and examined by Dr Rosa who agree for above plan. \par

## 2020-10-06 DIAGNOSIS — C50.511 MALIGNANT NEOPLASM OF LOWER-OUTER QUADRANT OF RIGHT FEMALE BREAST: ICD-10-CM

## 2020-10-23 ENCOUNTER — APPOINTMENT (OUTPATIENT)
Dept: BREAST CENTER | Facility: CLINIC | Age: 70
End: 2020-10-23
Payer: MEDICARE

## 2020-10-23 VITALS
BODY MASS INDEX: 31.45 KG/M2 | WEIGHT: 156 LBS | DIASTOLIC BLOOD PRESSURE: 80 MMHG | TEMPERATURE: 97.4 F | HEIGHT: 59 IN | SYSTOLIC BLOOD PRESSURE: 124 MMHG

## 2020-10-23 PROCEDURE — 99213 OFFICE O/P EST LOW 20 MIN: CPT

## 2020-10-23 NOTE — ASSESSMENT
[FreeTextEntry1] : RADHA is a ramila 69 year old patient who presented today in follow up for a history of right breast cancer. \par She has been doing well with no new breast related complaints. \par Sonographic imaging of the left breast was done on 08/10/2020, which revealed no sonographic evidence of malignancy. Stable left masses as described above, as detailed above. \par Physical exam was unrevealing today.\par \par RADHA is due for annual imaging with bilateral screening mammogram and sonogram at this time, \par and that will be scheduled today. \par We will plan on discussing results via telephone.\par If benign, RADHA should then have her screening imaging in October 2021 and return for follow-up and clinical breast exam following.\par \par RADHA VANCE has been enrolled in the breast cancer surgical survivorship care program.\par A copy of her survivorship care plan has been provided to her as well.\par \par I spent a total of 15 minutes of face to face time with this patient, greater than 50% of which was spent in counseling and/or coordination of care.\par All of her questions were appropriately answered.\par She knows to call with any concerns.

## 2020-10-23 NOTE — PHYSICAL EXAM
[Normocephalic] : normocephalic [Atraumatic] : atraumatic [No Supraclavicular Adenopathy] : no supraclavicular adenopathy [No dominant masses] : no dominant masses in right breast  [No dominant masses] : no dominant masses left breast [No Nipple Discharge] : no left nipple discharge [No Rashes] : no rashes [No Ulceration] : no ulceration [Breast Nipple Inversion] : nipples not inverted [Breast Nipple Retraction] : nipples not retracted [de-identified] : scattered bilateral fibroglandular densities.  [de-identified] : well healed surgical scars. [de-identified] : No axillary lymphadenopathy appreciated. [de-identified] : No axillary lymphadenopathy appreciated.

## 2020-10-23 NOTE — HISTORY OF PRESENT ILLNESS
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  \par Oncotype DX RS 18.  \par (+) Whole breast RT.  \par no chemo, completed Arimidex - Dr. Rosa.\par s/p US Guided Core Bx - 09/26/19:\par Left 1-2:00 N8, 6mm & Left 1-2:00 N8, 9mm - HYALINIZING FIBROADENOMA. \par \par RADHA VANCE is a 69 year old female patient who presents today in follow up for history of \par right breast cancer.\par Since her last visit, she has no new breast related complaints. \par Sonographic imaging of the left breast was done on 08/10/2020, which revealed no sonographic evidence of malignancy. Stable left masses as described above.\par \par She presents today for evaluation.

## 2020-10-23 NOTE — DATA REVIEWED
[FreeTextEntry1] : Left Breast Sono - 08/10/2020:\par Findings:\par \par Ultrasound:\par \par Targeted unilateral left breast ultrasound was performed.\par \par Left breast:\par At 1-2 o'clock N8 there is redemonstration of a stable oval circumscribed mass measuring 0.9 x 0.9 x 0.4 cm.\par Also noted at 1-2 o'clock N8 there is redemonstration of a stable oval circumscribed mass measuring 0.6 x 0.6 x 0.4 cm.\par \par Impression: No sonographic evidence of malignancy. Stable left masses as described above.\par \par Recommendation: Unless otherwise indicated by clinical findings, annual screening mammography recommended.\par \par BI-RADS Category 2: Benign

## 2020-10-28 ENCOUNTER — NON-APPOINTMENT (OUTPATIENT)
Age: 70
End: 2020-10-28

## 2020-10-28 ENCOUNTER — OUTPATIENT (OUTPATIENT)
Dept: OUTPATIENT SERVICES | Facility: HOSPITAL | Age: 70
LOS: 1 days | Discharge: HOME | End: 2020-10-28
Payer: MEDICARE

## 2020-10-28 ENCOUNTER — RESULT REVIEW (OUTPATIENT)
Age: 70
End: 2020-10-28

## 2020-10-28 DIAGNOSIS — Z96.1 PRESENCE OF INTRAOCULAR LENS: Chronic | ICD-10-CM

## 2020-10-28 DIAGNOSIS — Z12.31 ENCOUNTER FOR SCREENING MAMMOGRAM FOR MALIGNANT NEOPLASM OF BREAST: ICD-10-CM

## 2020-10-28 DIAGNOSIS — Z98.890 OTHER SPECIFIED POSTPROCEDURAL STATES: Chronic | ICD-10-CM

## 2020-10-28 DIAGNOSIS — R92.2 INCONCLUSIVE MAMMOGRAM: ICD-10-CM

## 2020-10-28 PROCEDURE — 77067 SCR MAMMO BI INCL CAD: CPT | Mod: 26

## 2020-10-28 PROCEDURE — 77063 BREAST TOMOSYNTHESIS BI: CPT | Mod: 26

## 2020-10-28 PROCEDURE — 76641 ULTRASOUND BREAST COMPLETE: CPT | Mod: 26,50

## 2020-11-10 ENCOUNTER — OUTPATIENT (OUTPATIENT)
Dept: OUTPATIENT SERVICES | Facility: HOSPITAL | Age: 70
LOS: 1 days | Discharge: HOME | End: 2020-11-10
Payer: MEDICARE

## 2020-11-10 ENCOUNTER — RESULT REVIEW (OUTPATIENT)
Age: 70
End: 2020-11-10

## 2020-11-10 DIAGNOSIS — Z96.1 PRESENCE OF INTRAOCULAR LENS: Chronic | ICD-10-CM

## 2020-11-10 DIAGNOSIS — Z98.890 OTHER SPECIFIED POSTPROCEDURAL STATES: Chronic | ICD-10-CM

## 2020-11-10 PROCEDURE — 19083 BX BREAST 1ST LESION US IMAG: CPT | Mod: LT

## 2020-11-10 PROCEDURE — 88313 SPECIAL STAINS GROUP 2: CPT | Mod: 26

## 2020-11-10 PROCEDURE — 77065 DX MAMMO INCL CAD UNI: CPT | Mod: 26,LT

## 2020-11-10 PROCEDURE — 88305 TISSUE EXAM BY PATHOLOGIST: CPT | Mod: 26

## 2020-11-12 LAB — SURGICAL PATHOLOGY STUDY: SIGNIFICANT CHANGE UP

## 2020-11-13 ENCOUNTER — NON-APPOINTMENT (OUTPATIENT)
Age: 70
End: 2020-11-13

## 2020-11-13 DIAGNOSIS — N63.20 UNSPECIFIED LUMP IN THE LEFT BREAST, UNSPECIFIED QUADRANT: ICD-10-CM

## 2020-11-13 DIAGNOSIS — R92.8 OTHER ABNORMAL AND INCONCLUSIVE FINDINGS ON DIAGNOSTIC IMAGING OF BREAST: ICD-10-CM

## 2020-11-13 DIAGNOSIS — D18.01 HEMANGIOMA OF SKIN AND SUBCUTANEOUS TISSUE: ICD-10-CM

## 2020-12-04 NOTE — ASU PATIENT PROFILE, ADULT - SPIRITUAL CULTURAL, CURRENT SITUATION, PROFILE
"OB 21wks c/o fever and congestion and is going to get Covid tested today. However, pt has appt today at 1515 and wants to discuss anatomy results with Dr. Salcido. US was yesterday, [facility] is closed today, and we do not have those results. Informed pt Dr. Salcido would be happy to speak with pt today via Telehealth but would not be able to discuss results with her as we do not have them to review. Pt states, \"That's fine, I just want to talk to him about what marginal cord insertion means and a few other things.\" Changed pt's appt to video visit and notified Dr. Salcido.   " [employer]

## 2021-07-23 NOTE — ASU PATIENT PROFILE, ADULT - DOES PATIENT HAVE ADVANCE DIRECTIVE
Nataly Margaret Bahena is a 19 y.o. female here for a non-provider visit for PPD placement -- Step 2 of 2    Reason for PPD:  school requirement    1. TB evaluation questionnaire completed by patient? Yes      -  If any answers marked yes did you contact a provider prior to placing? Not Indicated  2.  Patient notified to return to clinic for reading on: Sunday 7/25 after 9 am or Monday before 9 am  3.  PPD Placement documentation completed on TB evaluation questionnaire? Yes  4.  Location of TB evaluation questionnaire filed: [facility]    No

## 2021-10-08 ENCOUNTER — APPOINTMENT (OUTPATIENT)
Dept: HEMATOLOGY ONCOLOGY | Facility: CLINIC | Age: 71
End: 2021-10-08

## 2021-11-18 ENCOUNTER — RESULT REVIEW (OUTPATIENT)
Age: 71
End: 2021-11-18

## 2021-11-18 ENCOUNTER — OUTPATIENT (OUTPATIENT)
Dept: OUTPATIENT SERVICES | Facility: HOSPITAL | Age: 71
LOS: 1 days | Discharge: HOME | End: 2021-11-18
Payer: MEDICARE

## 2021-11-18 DIAGNOSIS — Z12.31 ENCOUNTER FOR SCREENING MAMMOGRAM FOR MALIGNANT NEOPLASM OF BREAST: ICD-10-CM

## 2021-11-18 DIAGNOSIS — R92.2 INCONCLUSIVE MAMMOGRAM: ICD-10-CM

## 2021-11-18 DIAGNOSIS — Z96.1 PRESENCE OF INTRAOCULAR LENS: Chronic | ICD-10-CM

## 2021-11-18 DIAGNOSIS — Z98.890 OTHER SPECIFIED POSTPROCEDURAL STATES: Chronic | ICD-10-CM

## 2021-11-18 PROCEDURE — 77063 BREAST TOMOSYNTHESIS BI: CPT | Mod: 26

## 2021-11-18 PROCEDURE — 76641 ULTRASOUND BREAST COMPLETE: CPT | Mod: 26,50

## 2021-11-18 PROCEDURE — 77067 SCR MAMMO BI INCL CAD: CPT | Mod: 26

## 2021-11-22 PROBLEM — R92.8 ABNORMAL FINDING ON BREAST IMAGING: Status: ACTIVE | Noted: 2019-09-11

## 2021-11-29 ENCOUNTER — APPOINTMENT (OUTPATIENT)
Dept: BREAST CENTER | Facility: CLINIC | Age: 71
End: 2021-11-29
Payer: MEDICARE

## 2021-11-29 VITALS
WEIGHT: 156 LBS | SYSTOLIC BLOOD PRESSURE: 132 MMHG | DIASTOLIC BLOOD PRESSURE: 78 MMHG | BODY MASS INDEX: 31.45 KG/M2 | HEIGHT: 59 IN | TEMPERATURE: 97.8 F

## 2021-11-29 DIAGNOSIS — R92.8 OTHER ABNORMAL AND INCONCLUSIVE FINDINGS ON DIAGNOSTIC IMAGING OF BREAST: ICD-10-CM

## 2021-11-29 DIAGNOSIS — D24.2 BENIGN NEOPLASM OF LEFT BREAST: ICD-10-CM

## 2021-11-29 PROCEDURE — 99213 OFFICE O/P EST LOW 20 MIN: CPT

## 2021-11-29 NOTE — HISTORY OF PRESENT ILLNESS
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  \par Oncotype DX RS 18.  \par (+) Whole breast RT.  \par no chemo, completed Arimidex - Dr. Rosa.\par s/p US Guided Core Bx - 09/26/19:\par Left 1-2:00 N8, 6mm & Left 1-2:00 N8, 9mm - HYALINIZING FIBROADENOMA. \par \par RADHA VANCE is a 68 year old female patient who presents today in follow up for history of \par right breast cancer.\par Since her last visit, she has no new breast related complaints. \par Imaging was done on 09/11/19, which revealed two hypoechoic masses in the left breast for which ultrasound guided core biopsy was recommended.\par BIopsies were performed on 09/26/19; pathology showed hyalinizing fibroadenomas - benign concordant histology.\par \par INTERVAL HISTORY 11/29/21\par RADHA VANCE is a 71 year old female patient who presents today in follow up for history of \par right breast cancer. \par She has history of stage IB (pT1c N0 M0), ER/[place] positive and HER_2/lloyd negative invasive ductal carcinoma of the right breast, status post lumpectomy and sentinel lymph node biopsy on 7/30/2012. \par \par She has no breast related complaints at this time.  She denies any breast pain, has not palpated any new palpable masses in either breast and denies any nipple discharge or retraction.  \par \par  Her Oncotype recurrence score was 18.\par \par Her imaging is as follows:\par 11/18/2021 b/l mammo and US\par -scattered areas of fibroglandular density\par -an area of architectural distortion at the site of lumpectomy seen in the right breast\par LEFT US:\par -@1-2N8  stable hypoechoic mass measuring 1.0 x 0.8 x 0.3 cm.\par -@1-2 N8 there is a stable hypoechoic mass measuring 0.5 x 0.6 0.3 cm.\par No other solid or cystic mass noted in either breast. No right or left axillary lymphadenopathy.\par BIRADS2

## 2021-11-29 NOTE — DATA REVIEWED
[FreeTextEntry1] : EXAM:  MG MAMMO SCREEN W YOSI BI#\par \par \par PROCEDURE DATE:  11/18/2021\par \par \par \par INTERPRETATION:  HISTORY:\par Bilateral MG MAMMO SCREEN W YOSI BI# was performed. Patient is 70 years old and is seen for screening.  The patient has a history of right lumpectomy in June, 2012. The patient has the following family history of breast cancer:  mother, at age 64, breast cancer.\par \par RISK ASSESSMENT:\par Tyrer-Haydenzick Lifetime Risk: 4.9\par \par CLINICAL BREAST EXAM:\par The patient reports her last clinical breast exam was performed over one year ago.\par \par COMPARISON STUDIES:\par The present examination has been compared to prior imaging studies performed at [facility] on 09/26/2019, 10/28/2020 and 11/10/2020.\par \par MAMMOGRAM FINDINGS:\par Mammography was performed including the following views: bilateral craniocaudal with tomosynthesis and bilateral mediolateral oblique with tomosynthesis.  The examination includes digital synthetic 2D and digital tomosynthesis 3D images. Additional imaging analysis was performed using CAD (computer-aided detection) software.\par \par There are scattered areas of fibroglandular density.\par \par There is an area of architectural distortion at the site of lumpectomy seen in the right breast.\par \par No suspicious mass, grouping of calcifications, or other abnormality is identified.\par \par IMPRESSION:\par There is no mammographic evidence of malignancy.\par \par RECOMMENDATION:\par Unless otherwise indicated by clinical findings, annual screening mammography recommended.\par \par ASSESSMENT:\par BI-RADS Category 2:  Benign\par \par EXAM:  MG US BREAST COMPLETE BI\par \par \par PROCEDURE DATE:  11/18/2021\par \par \par \par INTERPRETATION:  Clinical History / Reason for exam: Screening bilateral breast ultrasound.\par \par The patient reports her last clinical breast examination was performed about: twelve months ago.\par \par Family history of breast cancer: Mother at age 64.\par \par Comparisons: Breast ultrasound dating back 2019.\par \par Breast composition: There are scattered areas of fibroglandular density.\par \par Findings:\par \par Ultrasound:\par \par Bilateral whole breast ultrasound was performed.\par At 1-2 o'clock N8 of the left breast there is a stable hypoechoic mass measuring 1.0 x 0.8 x 0.3 cm.\par At 1-2 o'clock N8 there is a stable hypoechoic mass measuring 0.5 x 0.6 0.3 cm.\par No other solid or cystic mass noted in either breast. No right or left axillary lymphadenopathy.\par \par Impression: No sonographic evidence of malignancy.\par \par Recommendation: Unless otherwise indicated by clinical findings, annual screening mammography recommended.\par \par BI-RADS Category 2: Benign\par \par

## 2021-11-29 NOTE — PHYSICAL EXAM
[Normocephalic] : normocephalic [Atraumatic] : atraumatic [EOMI] : extra ocular movement intact [No Supraclavicular Adenopathy] : no supraclavicular adenopathy [No Cervical Adenopathy] : no cervical adenopathy [No dominant masses] : no dominant masses in right breast  [No dominant masses] : no dominant masses left breast [No Nipple Retraction] : no left nipple retraction [No Nipple Discharge] : no left nipple discharge [No Axillary Lymphadenopathy] : no left axillary lymphadenopathy [Soft] : abdomen soft [Not Tender] : non-tender [No Edema] : no edema [No Rashes] : no rashes [No Ulceration] : no ulceration [de-identified] : right breast is smaller than her left breast, but no suspicious abnormalities were palpated within either breast  [de-identified] : post radiation changes with some fibrosis, but no suspicious abnormalities palpated

## 2021-11-29 NOTE — ASSESSMENT
[FreeTextEntry1] : RADHA is a ramila 70 year old patient who presented today in follow up for a history of right breast cancer. \par She has been doing well with no new breast related complaints. \par \par Physical exam was unrevealing today.\par \par Her imaging is as follows:\par 11/18/2021 b/l mammo and US\par -scattered areas of fibroglandular density\par -an area of architectural distortion at the site of lumpectomy seen in the right breast\par LEFT US:\par -@1-2N8  stable hypoechoic mass measuring 1.0 x 0.8 x 0.3 cm.\par -@1-2 N8 there is a stable hypoechoic mass measuring 0.5 x 0.6 0.3 cm.\par No other solid or cystic mass noted in either breast. No right or left axillary lymphadenopathy.\par BIRADS2\par \par She will be due for a b/l screening mammogram and L breast US on 11/18/2022.  This will be scheduled for her today.  She can follow up in survivorship after her imaging. \par \par All of her questions were answered.  She knows to call with any further questions or concerns. \par \par PLAN:\par -b/l mammo and US 11/19/21\par -follow up after \par -RADHA VANCE has been enrolled in the breast cancer surgical survivorship care program.\par

## 2022-11-21 ENCOUNTER — NON-APPOINTMENT (OUTPATIENT)
Age: 72
End: 2022-11-21

## 2022-12-01 ENCOUNTER — RESULT REVIEW (OUTPATIENT)
Age: 72
End: 2022-12-01

## 2022-12-01 ENCOUNTER — OUTPATIENT (OUTPATIENT)
Dept: OUTPATIENT SERVICES | Facility: HOSPITAL | Age: 72
LOS: 1 days | Discharge: HOME | End: 2022-12-01

## 2022-12-01 DIAGNOSIS — Z12.31 ENCOUNTER FOR SCREENING MAMMOGRAM FOR MALIGNANT NEOPLASM OF BREAST: ICD-10-CM

## 2022-12-01 DIAGNOSIS — Z96.1 PRESENCE OF INTRAOCULAR LENS: Chronic | ICD-10-CM

## 2022-12-01 DIAGNOSIS — Z98.890 OTHER SPECIFIED POSTPROCEDURAL STATES: Chronic | ICD-10-CM

## 2022-12-01 DIAGNOSIS — R92.2 INCONCLUSIVE MAMMOGRAM: ICD-10-CM

## 2022-12-01 PROCEDURE — 77063 BREAST TOMOSYNTHESIS BI: CPT | Mod: 26

## 2022-12-01 PROCEDURE — 77067 SCR MAMMO BI INCL CAD: CPT | Mod: 26

## 2022-12-01 PROCEDURE — 76641 ULTRASOUND BREAST COMPLETE: CPT | Mod: 26,50

## 2022-12-02 ENCOUNTER — APPOINTMENT (OUTPATIENT)
Dept: BREAST CENTER | Facility: CLINIC | Age: 72
End: 2022-12-02

## 2022-12-02 VITALS
DIASTOLIC BLOOD PRESSURE: 88 MMHG | HEIGHT: 59 IN | SYSTOLIC BLOOD PRESSURE: 124 MMHG | BODY MASS INDEX: 31.45 KG/M2 | WEIGHT: 156 LBS

## 2022-12-02 DIAGNOSIS — C50.511 MALIGNANT NEOPLASM OF LOWER-OUTER QUADRANT OF RIGHT FEMALE BREAST: ICD-10-CM

## 2022-12-02 PROCEDURE — 99213 OFFICE O/P EST LOW 20 MIN: CPT

## 2022-12-02 NOTE — HISTORY OF PRESENT ILLNESS
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  \par Oncotype DX RS 18.  \par (+) Whole breast RT.  \par no chemo, completed Arimidex - Dr. Rosa.\par s/p US Guided Core Bx - 09/26/19:\par Left 1-2:00 N8, 6mm & Left 1-2:00 N8, 9mm - HYALINIZING FIBROADENOMA. \par \par \par RADHA VANCE is a 72 year old female patient who presents today in follow up for history of \par right breast cancer.\par Since her last visit, she has no new breast related complaints. \par She is tearful at today's visit due to the loss of her .\par \par Most recent imaging:\par B/L Screening Mammo - 12/01/2022:\par -There are scattered areas of fibroglandular density.\par -There is an area of architectural distortion at the site of lumpectomy seen in the right breast.\par -There is no mammographic evidence of malignancy.\par BI-RADS Category 2:  Benign\par \par B/L Breast Sono - 12/01/2022:\par -At 1-2 o'clock N8 of the left breast there is a stable biopsy proven benign hypoechoic mass measuring 1.0 x 0.8 x 0.3 cm.\par -At 1-2 o'clock N8 of the left breast there is a stable biopsy proven benign hypoechoic mass measuring 0.6 x 0.5 x 0.3 cm.\par -Simple appearing cyst is noted in the right breast.\par -No sonographic evidence of malignancy.\par BI-RADS Category 2: Benign\par \par She presents today for evaluation and imaging review

## 2022-12-02 NOTE — PHYSICAL EXAM
[Normocephalic] : normocephalic [Atraumatic] : atraumatic [No Supraclavicular Adenopathy] : no supraclavicular adenopathy [No dominant masses] : no dominant masses in right breast  [No dominant masses] : no dominant masses left breast [No Nipple Discharge] : no left nipple discharge [No Rashes] : no rashes [No Ulceration] : no ulceration [Breast Nipple Inversion] : nipples not inverted [Breast Nipple Retraction] : nipples not retracted [de-identified] : scattered bilateral fibroglandular densities.  [de-identified] : well healed surgical scars; palpable scar tissue. [de-identified] : No axillary lymphadenopathy appreciated. [de-identified] : No axillary lymphadenopathy appreciated.

## 2022-12-02 NOTE — ASSESSMENT
[FreeTextEntry1] : RADHA is a ramila 72 year old patient who presented today in follow up for a history of right breast cancer. \par She has been doing well with no new breast related complaints. \par Most recent imaging:\par B/L Screening Mammo - 12/01/2022:\par -There are scattered areas of fibroglandular density.\par -There is an area of architectural distortion at the site of lumpectomy seen in the right breast.\par -There is no mammographic evidence of malignancy.\par BI-RADS Category 2:  Benign\par \par B/L Breast Sono - 12/01/2022:\par -At 1-2 o'clock N8 of the left breast there is a stable biopsy proven benign hypoechoic mass measuring 1.0 x 0.8 x 0.3 cm.\par -At 1-2 o'clock N8 of the left breast there is a stable biopsy proven benign hypoechoic mass measuring 0.6 x 0.5 x 0.3 cm.\par -Simple appearing cyst is noted in the right breast.\par -No sonographic evidence of malignancy.\par BI-RADS Category 2: Benign, as detailed above. \par Physical exam was unrevealing today.\par \par RADHA will be due for annual imaging with bilateral screening mammogram and sonogram in December 2023, \par and that will be scheduled today. \par She will return for follow-up and clinical breast exam following.\par \par I spent a total of 20 minutes of face to face time with this patient, greater than 50% of which was spent in counseling and/or coordination of care.\par All of her questions were appropriately answered.\par She knows to call with any concerns.

## 2022-12-02 NOTE — DATA REVIEWED
[FreeTextEntry1] : B/L Screening Mammo - 12/01/2022:\par MAMMOGRAM FINDINGS:\par Mammography was performed including the following views: bilateral craniocaudal with tomosynthesis, bilateral mediolateral oblique with tomosynthesis.  The examination includes digital synthetic 2D and digital tomosynthesis 3D images. Additional imaging analysis was performed using CAD (computer-aided detection) software.\par \par There are scattered areas of fibroglandular density.\par \par There is an area of architectural distortion at the site of lumpectomy seen in the right breast.\par \par No suspicious mass, grouping of calcifications, or other abnormality is identified.\par \par IMPRESSION:\par There is no mammographic evidence of malignancy.\par \par RECOMMENDATION:\par Unless otherwise indicated by clinical findings, annual screening mammography recommended.\par \par ASSESSMENT:\par BI-RADS Category 2:  Benign\par \par \par B/L Breast Sono - 12/01/2022:\par Findings:\par \par Ultrasound:\par \par Bilateral whole breast ultrasound was performed.\par At 1-2 o'clock N8 of the left breast there is a stable biopsy proven benign hypoechoic mass measuring 1.0 x 0.8 x 0.3 cm.\par At 1-2 o'clock N8 of the left breast there is a stable biopsy proven benign hypoechoic mass measuring 0.6 x 0.5 x 0.3 cm.\par Simple appearing cyst is noted in the right breast.\par No other solid or cystic mass noted in either breast. No right or left axillary lymphadenopathy.\par \par Impression: No sonographic evidence of malignancy.\par \par Recommendation: Unless otherwise indicated by clinical findings, annual screening mammography recommended.\par \par BI-RADS Category 2: Benign

## 2023-01-01 NOTE — ASU PATIENT PROFILE, ADULT - AS SC BRADEN FRICTION
Speech Therapy    Visit Type: Treatment  Born at Gestational Age: 35w5d and now corrected gestational age 37w 5d    Nursing comments: Decreased PO feeds, approx 50% on slow flow  Present at bedside: SLP and patient    SUBJECTIVE  Baby is SGA; baby's twin has discharged home.  RN notes decreased PO intake overnight.  Patient / Family Goals: discharge to home    OBJECTIVE        Infant Regulation   - Symptoms at onset of session:     • neurobehavioral:       - autonomic stability: regular respiration, stable heart rate, smooth respirations, oxygen saturations within parameters and baseline, stable color       - state stability: focused eyes and quiet alert       - motor stability: coordinated movement       - motor instability: arching   - Sensory integration interventions: organizing techniques, alerting techniques, swaddle and anticipatory touch   - Response to interventions: achieved light sleep state by end of session, re-organization of state and re-organization of motor system     Infant Feeding  Onset of Session   - Interventions prior to feeding: swaddle, organizing techniques and alerting techniques   - Nutrition: breastmilk;  22 Jose    Feeding Readiness   - Demonstrates steady growth/weight gain: yes   - Weight is over 1300 grams: yes   - Tolerates bolus feedings without stress: yes   - Stable on appropriate flow: yes   - Neurodevelopmental maturation including alertness for feedings: yes   - Demonstrates hunger cues: yes   - Cardio-respiratory stability with cares and tube feedings: yes   - Able to remain stable with holding for 20-30 minutes during tube feeding: yes   - Infant appropriate for: PO feedings    Oral Feeding Session  Trial 1   - Feeder: SLP   - Infant consumed       • 20 mls by mouth   - Mode: bottle and slow flow nipple   - Oral phase: anterior spillage (inconsistent)   - Pharyngeal phase: intact and back arch   - Neurobehavioral Status: During Session       • Physiologic stability: baseline,  stable color       • State stability: focused eyes and quiet alert       • State instability: loss of alertness       • Motor stability: coordinated movement and balance of flexion/extension       Motor instability: suspect related to reflux; audibly wet burping.  Oral Feeding Summary   - Suck endurance: intermittent suck bursts and required pacing for safety;  20 minutes   - Sucks per burst: 5  Feeding Interventions   - Paced: feeder initiated pacing for safety and every 4-5 sucks   - Tolerance for session: tolerated with stability (minimal)                 ASSESSMENT                                                                        • Impairments: swallowing  • Functional Limitations: eating/drinking  • Skilled therapy is required to establish safe means of nutrition, hydration and medication administration  • Baby demonstrated stable feeding with initial pacing every 4-5 sucks.  Baby overall more disorganized this feeding compared to session yesterday. Total PO intake 20 ml.  • Rehab Potential: good      Education:   - Present and not ready to learn: patient's family  Patient at end of session:    [place] [facility] [place]        Frequency: Thur D1/1-3    Interventions:  Dysphagia therapy   Plan/Goal Agreement: Parent/caregiver agrees with goals and treatment plan    RECOMMENDATIONS  Diet: PO/NG  Aspiration risk: minimal  Factors impacting feeding: prematurity  Infant Feeding Plan:    - PO only when infant is showing sign of engagement and interest   - Mode: slow flow nipple   - Positioning: elevated sidelying and upright (Baby benefits from facial cues/communication when in upright position)   - Pacing: every 4-5 sucks, feeder initiated pacing for safety and at onset of feeding    GOALS    Long Term Goals:   Discussed with: EDAWR Bowser    Goals: to be met at time of discharge   Maintain organized and efficient suck during nipple feeding to support intake of necessary volume for weight gain across all  feedings for at least a 72 hour period        Documented in the chart in the following areas: Assessment.        Therapy procedure time and total treatment time can be found documented on the Time Entry flowsheet.   (3) no apparent problem

## 2023-01-30 ENCOUNTER — EMERGENCY (EMERGENCY)
Facility: HOSPITAL | Age: 73
LOS: 0 days | Discharge: HOME | End: 2023-01-30
Attending: EMERGENCY MEDICINE | Admitting: EMERGENCY MEDICINE
Payer: MEDICARE

## 2023-01-30 VITALS
OXYGEN SATURATION: 98 % | RESPIRATION RATE: 14 BRPM | HEART RATE: 101 BPM | SYSTOLIC BLOOD PRESSURE: 161 MMHG | TEMPERATURE: 99 F | DIASTOLIC BLOOD PRESSURE: 86 MMHG

## 2023-01-30 VITALS — HEART RATE: 88 BPM

## 2023-01-30 DIAGNOSIS — R51.9 HEADACHE, UNSPECIFIED: ICD-10-CM

## 2023-01-30 DIAGNOSIS — K21.9 GASTRO-ESOPHAGEAL REFLUX DISEASE WITHOUT ESOPHAGITIS: ICD-10-CM

## 2023-01-30 DIAGNOSIS — Z98.890 OTHER SPECIFIED POSTPROCEDURAL STATES: Chronic | ICD-10-CM

## 2023-01-30 DIAGNOSIS — E78.00 PURE HYPERCHOLESTEROLEMIA, UNSPECIFIED: ICD-10-CM

## 2023-01-30 DIAGNOSIS — E03.9 HYPOTHYROIDISM, UNSPECIFIED: ICD-10-CM

## 2023-01-30 DIAGNOSIS — Z90.11 ACQUIRED ABSENCE OF RIGHT BREAST AND NIPPLE: ICD-10-CM

## 2023-01-30 DIAGNOSIS — Z96.1 PRESENCE OF INTRAOCULAR LENS: Chronic | ICD-10-CM

## 2023-01-30 DIAGNOSIS — I10 ESSENTIAL (PRIMARY) HYPERTENSION: ICD-10-CM

## 2023-01-30 DIAGNOSIS — Z96.1 PRESENCE OF INTRAOCULAR LENS: ICD-10-CM

## 2023-01-30 LAB
ALBUMIN SERPL ELPH-MCNC: 4.9 G/DL — SIGNIFICANT CHANGE UP (ref 3.5–5.2)
ALP SERPL-CCNC: 84 U/L — SIGNIFICANT CHANGE UP (ref 30–115)
ALT FLD-CCNC: 22 U/L — SIGNIFICANT CHANGE UP (ref 0–41)
ANION GAP SERPL CALC-SCNC: 10 MMOL/L — SIGNIFICANT CHANGE UP (ref 7–14)
APPEARANCE UR: CLEAR — SIGNIFICANT CHANGE UP
AST SERPL-CCNC: 21 U/L — SIGNIFICANT CHANGE UP (ref 0–41)
BACTERIA # UR AUTO: NEGATIVE — SIGNIFICANT CHANGE UP
BASOPHILS # BLD AUTO: 0.06 K/UL — SIGNIFICANT CHANGE UP (ref 0–0.2)
BASOPHILS NFR BLD AUTO: 0.8 % — SIGNIFICANT CHANGE UP (ref 0–1)
BILIRUB SERPL-MCNC: 0.2 MG/DL — SIGNIFICANT CHANGE UP (ref 0.2–1.2)
BILIRUB UR-MCNC: NEGATIVE — SIGNIFICANT CHANGE UP
BUN SERPL-MCNC: 17 MG/DL — SIGNIFICANT CHANGE UP (ref 10–20)
CALCIUM SERPL-MCNC: 10.1 MG/DL — SIGNIFICANT CHANGE UP (ref 8.4–10.4)
CHLORIDE SERPL-SCNC: 102 MMOL/L — SIGNIFICANT CHANGE UP (ref 98–110)
CO2 SERPL-SCNC: 26 MMOL/L — SIGNIFICANT CHANGE UP (ref 17–32)
COLOR SPEC: YELLOW — SIGNIFICANT CHANGE UP
CREAT SERPL-MCNC: 0.7 MG/DL — SIGNIFICANT CHANGE UP (ref 0.7–1.5)
DIFF PNL FLD: NEGATIVE — SIGNIFICANT CHANGE UP
EGFR: 91 ML/MIN/1.73M2 — SIGNIFICANT CHANGE UP
EOSINOPHIL # BLD AUTO: 0.04 K/UL — SIGNIFICANT CHANGE UP (ref 0–0.7)
EOSINOPHIL NFR BLD AUTO: 0.6 % — SIGNIFICANT CHANGE UP (ref 0–8)
EPI CELLS # UR: 1 /HPF — SIGNIFICANT CHANGE UP (ref 0–5)
GLUCOSE SERPL-MCNC: 98 MG/DL — SIGNIFICANT CHANGE UP (ref 70–99)
GLUCOSE UR QL: NEGATIVE — SIGNIFICANT CHANGE UP
HCT VFR BLD CALC: 40.8 % — SIGNIFICANT CHANGE UP (ref 37–47)
HGB BLD-MCNC: 13.3 G/DL — SIGNIFICANT CHANGE UP (ref 12–16)
HYALINE CASTS # UR AUTO: 0 /LPF — SIGNIFICANT CHANGE UP (ref 0–7)
IMM GRANULOCYTES NFR BLD AUTO: 0.4 % — HIGH (ref 0.1–0.3)
KETONES UR-MCNC: ABNORMAL
LEUKOCYTE ESTERASE UR-ACNC: ABNORMAL
LYMPHOCYTES # BLD AUTO: 2.03 K/UL — SIGNIFICANT CHANGE UP (ref 1.2–3.4)
LYMPHOCYTES # BLD AUTO: 28.2 % — SIGNIFICANT CHANGE UP (ref 20.5–51.1)
MCHC RBC-ENTMCNC: 27.5 PG — SIGNIFICANT CHANGE UP (ref 27–31)
MCHC RBC-ENTMCNC: 32.6 G/DL — SIGNIFICANT CHANGE UP (ref 32–37)
MCV RBC AUTO: 84.3 FL — SIGNIFICANT CHANGE UP (ref 81–99)
MONOCYTES # BLD AUTO: 0.3 K/UL — SIGNIFICANT CHANGE UP (ref 0.1–0.6)
MONOCYTES NFR BLD AUTO: 4.2 % — SIGNIFICANT CHANGE UP (ref 1.7–9.3)
NEUTROPHILS # BLD AUTO: 4.73 K/UL — SIGNIFICANT CHANGE UP (ref 1.4–6.5)
NEUTROPHILS NFR BLD AUTO: 65.8 % — SIGNIFICANT CHANGE UP (ref 42.2–75.2)
NITRITE UR-MCNC: NEGATIVE — SIGNIFICANT CHANGE UP
NRBC # BLD: 0 /100 WBCS — SIGNIFICANT CHANGE UP (ref 0–0)
PH UR: 6 — SIGNIFICANT CHANGE UP (ref 5–8)
PLATELET # BLD AUTO: 283 K/UL — SIGNIFICANT CHANGE UP (ref 130–400)
POTASSIUM SERPL-MCNC: 4.3 MMOL/L — SIGNIFICANT CHANGE UP (ref 3.5–5)
POTASSIUM SERPL-SCNC: 4.3 MMOL/L — SIGNIFICANT CHANGE UP (ref 3.5–5)
PROT SERPL-MCNC: 7.9 G/DL — SIGNIFICANT CHANGE UP (ref 6–8)
PROT UR-MCNC: SIGNIFICANT CHANGE UP
RBC # BLD: 4.84 M/UL — SIGNIFICANT CHANGE UP (ref 4.2–5.4)
RBC # FLD: 13.8 % — SIGNIFICANT CHANGE UP (ref 11.5–14.5)
RBC CASTS # UR COMP ASSIST: 1 /HPF — SIGNIFICANT CHANGE UP (ref 0–4)
SODIUM SERPL-SCNC: 138 MMOL/L — SIGNIFICANT CHANGE UP (ref 135–146)
SP GR SPEC: 1.01 — SIGNIFICANT CHANGE UP (ref 1.01–1.03)
UROBILINOGEN FLD QL: SIGNIFICANT CHANGE UP
WBC # BLD: 7.19 K/UL — SIGNIFICANT CHANGE UP (ref 4.8–10.8)
WBC # FLD AUTO: 7.19 K/UL — SIGNIFICANT CHANGE UP (ref 4.8–10.8)
WBC UR QL: 6 /HPF — HIGH (ref 0–5)

## 2023-01-30 PROCEDURE — 99285 EMERGENCY DEPT VISIT HI MDM: CPT | Mod: FS

## 2023-01-30 PROCEDURE — 70450 CT HEAD/BRAIN W/O DYE: CPT | Mod: 26,MA

## 2023-01-30 PROCEDURE — 93010 ELECTROCARDIOGRAM REPORT: CPT

## 2023-01-30 NOTE — ED PROVIDER NOTE - NSFOLLOWUPINSTRUCTIONS_ED_ALL_ED_FT
Headache    A headache is pain or discomfort felt around the head or neck area. The specific cause of a headache may not be found as there are many types including tension headaches, migraine headaches, and cluster headaches. Watch your condition for any changes. Things you can do to manage your pain include taking over the counter and prescription medications as instructed by your health care provider, lying down in a dark quiet room, limiting stress, getting regular sleep, and refraining from alcohol and tobacco products.    SEEK IMMEDIATE MEDICAL CARE IF YOU HAVE ANY OF THE FOLLOWING SYMPTOMS: fever, vomiting, stiff neck, loss of vision, problems with speech, muscle weakness, loss of balance, trouble walking, passing out, or confusion.    Hypertension  Hypertension, commonly called high blood pressure, is when the force of blood pumping through the arteries is too strong. The arteries are the blood vessels that carry blood from the heart throughout the body. Hypertension forces the heart to work harder to pump blood and may cause arteries to become narrow or stiff. Having untreated or uncontrolled hypertension can cause heart attacks, strokes, kidney disease, and other problems.    A blood pressure reading consists of a higher number over a lower number. Ideally, your blood pressure should be below 120/80. The first ("top") number is called the systolic pressure. It is a measure of the pressure in your arteries as your heart beats. The second ("bottom") number is called the diastolic pressure. It is a measure of the pressure in your arteries as the heart relaxes.    What are the causes?  The cause of this condition is not known.    What increases the risk?  Some risk factors for high blood pressure are under your control. Others are not.    Factors you can change     Smoking.  Having type 2 diabetes mellitus, high cholesterol, or both.  Not getting enough exercise or physical activity.  Being overweight.  Having too much fat, sugar, calories, or salt (sodium) in your diet.  Drinking too much alcohol.  Factors that are difficult or impossible to change     Having chronic kidney disease.  Having a family history of high blood pressure.  Age. Risk increases with age.  Race. You may be at higher risk if you are -American.  Gender. Men are at higher risk than women before age 45. After age 65, women are at higher risk than men.  Having obstructive sleep apnea.  Stress.  What are the signs or symptoms?  Extremely high blood pressure (hypertensive crisis) may cause:    Headache.  Anxiety.  Shortness of breath.  Nosebleed.  Nausea and vomiting.  Severe chest pain.  Jerky movements you cannot control (seizures).    How is this diagnosed?  This condition is diagnosed by measuring your blood pressure while you are seated, with your arm resting on a surface. The cuff of the blood pressure monitor will be placed directly against the skin of your upper arm at the level of your heart. It should be measured at least twice using the same arm. Certain conditions can cause a difference in blood pressure between your right and left arms.    Certain factors can cause blood pressure readings to be lower or higher than normal (elevated) for a short period of time:    When your blood pressure is higher when you are in a health care provider's office than when you are at home, this is called white coat hypertension. Most people with this condition do not need medicines.  When your blood pressure is higher at home than when you are in a health care provider's office, this is called masked hypertension. Most people with this condition may need medicines to control blood pressure.    If you have a high blood pressure reading during one visit or you have normal blood pressure with other risk factors:    You may be asked to return on a different day to have your blood pressure checked again.  You may be asked to monitor your blood pressure at home for 1 week or longer.    If you are diagnosed with hypertension, you may have other blood or imaging tests to help your health care provider understand your overall risk for other conditions.    How is this treated?  This condition is treated by making healthy lifestyle changes, such as eating healthy foods, exercising more, and reducing your alcohol intake. Your health care provider may prescribe medicine if lifestyle changes are not enough to get your blood pressure under control, and if:    Your systolic blood pressure is above 130.  Your diastolic blood pressure is above 80.    Your personal target blood pressure may vary depending on your medical conditions, your age, and other factors.    Follow these instructions at home:  Eating and drinking     Eat a diet that is high in fiber and potassium, and low in sodium, added sugar, and fat. An example eating plan is called the DASH (Dietary Approaches to Stop Hypertension) diet. To eat this way:    Eat plenty of fresh fruits and vegetables. Try to fill half of your plate at each meal with fruits and vegetables.  Eat whole grains, such as whole wheat pasta, brown rice, or whole grain bread. Fill about one quarter of your plate with whole grains.  Eat or drink low-fat dairy products, such as skim milk or low-fat yogurt.  Avoid fatty cuts of meat, processed or cured meats, and poultry with skin. Fill about one quarter of your plate with lean proteins, such as fish, chicken without skin, beans, eggs, and tofu.  Avoid premade and processed foods. These tend to be higher in sodium, added sugar, and fat.    Reduce your daily sodium intake. Most people with hypertension should eat less than 1,500 mg of sodium a day.  ImageLimit alcohol intake to no more than 1 drink a day for nonpregnant women and 2 drinks a day for men. One drink equals 12 oz of beer, 5 oz of wine, or 1½ oz of hard liquor.  Lifestyle     Work with your health care provider to maintain a healthy body weight or to lose weight. Ask what an ideal weight is for you.  Get at least 30 minutes of exercise that causes your heart to beat faster (aerobic exercise) most days of the week. Activities may include walking, swimming, or biking.  Include exercise to strengthen your muscles (resistance exercise), such as pilates or lifting weights, as part of your weekly exercise routine. Try to do these types of exercises for 30 minutes at least 3 days a week.  Do not use any products that contain nicotine or tobacco, such as cigarettes and e-cigarettes. If you need help quitting, ask your health care provider.  Monitor your blood pressure at home as told by your health care provider.  Keep all follow-up visits as told by your health care provider. This is important.  Medicines     Take over-the-counter and prescription medicines only as told by your health care provider. Follow directions carefully. Blood pressure medicines must be taken as prescribed.  Do not skip doses of blood pressure medicine. Doing this puts you at risk for problems and can make the medicine less effective.  Ask your health care provider about side effects or reactions to medicines that you should watch for.  Contact a health care provider if:  You think you are having a reaction to a medicine you are taking.  You have headaches that keep coming back (recurring).  You feel dizzy.  You have swelling in your ankles.  You have trouble with your vision.  Get help right away if:  You develop a severe headache or confusion.  You have unusual weakness or numbness.  You feel faint.  You have severe pain in your chest or abdomen.  You vomit repeatedly.  You have trouble breathing.  Summary  Hypertension is when the force of blood pumping through your arteries is too strong. If this condition is not controlled, it may put you at risk for serious complications.  Your personal target blood pressure may vary depending on your medical conditions, your age, and other factors. For most people, a normal blood pressure is less than 120/80.  Hypertension is treated with lifestyle changes, medicines, or a combination of both. Lifestyle changes include weight loss, eating a healthy, low-sodium diet, exercising more, and limiting alcohol.  This information is not intended to replace advice given to you by your health care provider. Make sure you discuss any questions you have with your health care provider.

## 2023-01-30 NOTE — ED PROVIDER NOTE - NSFOLLOWUPCLINICS_GEN_ALL_ED_FT
[facility]  Neurology  [address] [place] [address], [facility] [address]  [place] [address]  Phone: [phone]  Fax:   Follow Up Time: 7-10 Days

## 2023-01-30 NOTE — ED PROVIDER NOTE - PHYSICAL EXAMINATION
Physical Exam    Vital Signs: I have reviewed the initial vital signs.  Constitutional: well-nourished, appears stated age, no acute distress  Eyes: Conjunctiva pink, Sclera clear, PERRLA, EOMI without pain.  Cardiovascular: S1 and S2, regular rate, regular rhythm, well-perfused extremities, radial pulses equal and 2+ b/l.   Respiratory: unlabored respiratory effort, clear to auscultation bilaterally no wheezing, rales and rhonchi. pt is speaking full sentences. no accessory muscle use.   Gastrointestinal: soft, non-tender, nondistended abdomen, no pulsatile mass, normal bowl sounds, no rebound, no guarding  Musculoskeletal: supple neck, no lower extremity edema, no calf tenderness, FROM of b/l upper and lower extremities.   Integumentary: warm, dry, no rash  Neurologic: pt right eyebrow is lower than left eyebrow which pt and pt daughter report is chronic. awake, alert, cranial nerves II-XII grossly intact, extremities’ motor and sensory functions grossly intact. finger to nose intact. negative pronator drift. 5/5 strength throughout. steady gait.   Psychiatric: appropriate mood, appropriate affect

## 2023-01-30 NOTE — ED PROVIDER NOTE - ATTENDING APP SHARED VISIT CONTRIBUTION OF CARE
72-year-old female history of HTN, HLD, recently had antihypertensives adjusted by her primary physician on 1/26, subsequently saw cardiology on 1/27 in routine consultation, now presents with gradual onset of nonexertional headache (on the top of her head) for the past several days, persistent, denies any fine factors, described as a "tightening" sensation, denies recent trauma, paresthesias, focal weakness, fevers, chills, neck stiffness, visual disturbances or pain, facial swelling, dental pain or other associated complaints at present. Old chart reviewed. I have reviewed and agree with the initial nursing note, except as documented in my note.    VSS, awake, alert, non-toxic appearing, no scalp tenderness or pulsatile vasculature, PERRL / EOMI, no conjunctival injection, ears clear, oropharynx clear and w/o signs dental space infection, no JVD or bruit, no skin rash or lesions, chest CTAB, no w/r/r, +S1/S2, RRR, no m/r/g, abdomen soft, NT, ND, +BS, no peripheral edema, AO x 3, clear speech, steady gait.

## 2023-01-30 NOTE — ED PROVIDER NOTE - PATIENT PORTAL LINK FT
You can access the FollowMyHealth Patient Portal offered by [facility] by registering at the following website: [email or web address]. By joining [facility]’s FollowMyHealth portal, you will also be able to view your health information using other applications (apps) compatible with our system.

## 2023-01-30 NOTE — ED ADULT NURSE NOTE - AS SC BRADEN FRICTION
----- Message from Kelly Arriaga MD sent at 10/3/2022 10:20 AM CDT -----  Normal urine test. Normal vitamin D levels.  Potassium levels is minimally elevated. Recommend eating food low in potassium.   Normal liver and kidney function. Electrolytes appropriate.  No anemia present on lab testing. Normal thyroid function. Cholesterol within a healthy range.  Blood sugar is minimally elevated but no evidence of diabetes.   Healthy diet and lifestyle encouraged to maintain good levels.         (3) no apparent problem

## 2023-01-30 NOTE — ED PROVIDER NOTE - OBJECTIVE STATEMENT
73 y/o female with a PMH of HTN, GERD, HLD, and hypothyroidism presents to the ED for evaluation of headaches and hypertension. pt reports on thursday she was seen by her PCP Dr. Doe. pt was seen by cardio Dr. Stephenson on friday. pt takes valsartan 160mg and amlodipine 10mg,. pt reports since friday she has been taking 320mg of valsartan which she was told to double by her pcp. pt denies fever, chills, dizziness, visual changes, neck pain, chest pain, sob, abdominal pain, n/v/d/c, urinary or bowel retention or incontinence, rashes, weakness, numbness, or tingling.

## 2023-01-30 NOTE — ED PROVIDER NOTE - CLINICAL SUMMARY MEDICAL DECISION MAKING FREE TEXT BOX
This patient presents with a headache most consistent with benign headache from either tension type headache vs migraine. No headache red flags. Neurologic exam without evidence of meningismus, AMS, focal neurologic findings so doubt meningitis, encephalitis, stroke. Presentation not consistent with acute intracranial bleed to include SAH (lack of risk factors, headache history). No history of trauma so doubt ICH. Given history and physical temporal arteritis unlikely, as is acute angle closure glaucoma. Doubt carotid artery dissection given no focal neuro deficits, no neck trauma or recent neck strain. Patient with no signs of increased intracranial pressure or weight loss and history and physical suggest more benign headache so less likely mass effect in brain from tumor or abscess or idiopathic intracranial hypertension. Patient discharged home with PMD follow up. [Muscle Cramps] : muscle cramps [Negative] : Endocrine

## 2023-01-30 NOTE — ED ADULT NURSE NOTE - NSICDXPASTMEDICALHX_GEN_ALL_CORE_FT
PAST MEDICAL HISTORY:  GERD (gastroesophageal reflux disease)     High cholesterol     Hypertension     Hypothyroid

## 2023-01-30 NOTE — ED ADULT NURSE NOTE - OBJECTIVE STATEMENT
pt c/o of a headache on top of her head, htn, and b/l leg weakness. pt increased bp medication as directed but states no relief. pt took blood pressure today and had a reading in the "300s" which prompted her to call EMS. pt denies cp/sob. denies fever/chills. denies n/v/d.
[phone]

## 2023-01-30 NOTE — ED ADULT NURSE NOTE - NSICDXPASTSURGICALHX_GEN_ALL_CORE_FT
PAST SURGICAL HISTORY:  History of intraocular lens implant right    Status post right breast lumpectomy

## 2023-01-30 NOTE — ED PROVIDER NOTE - NS ED ATTENDING STATEMENT MOD
This was a shared visit with the [facility]. I reviewed and verified the documentation and independently performed the documented:

## 2023-12-08 ENCOUNTER — RESULT REVIEW (OUTPATIENT)
Age: 73
End: 2023-12-08

## 2023-12-08 ENCOUNTER — OUTPATIENT (OUTPATIENT)
Dept: OUTPATIENT SERVICES | Facility: HOSPITAL | Age: 73
LOS: 1 days | End: 2023-12-08
Payer: MEDICARE

## 2023-12-08 DIAGNOSIS — Z00.8 ENCOUNTER FOR OTHER GENERAL EXAMINATION: ICD-10-CM

## 2023-12-08 DIAGNOSIS — R92.2 INCONCLUSIVE MAMMOGRAM: ICD-10-CM

## 2023-12-08 DIAGNOSIS — Z96.1 PRESENCE OF INTRAOCULAR LENS: Chronic | ICD-10-CM

## 2023-12-08 DIAGNOSIS — Z12.31 ENCOUNTER FOR SCREENING MAMMOGRAM FOR MALIGNANT NEOPLASM OF BREAST: ICD-10-CM

## 2023-12-08 DIAGNOSIS — Z98.890 OTHER SPECIFIED POSTPROCEDURAL STATES: Chronic | ICD-10-CM

## 2023-12-08 PROCEDURE — 77067 SCR MAMMO BI INCL CAD: CPT | Mod: 26

## 2023-12-08 PROCEDURE — 77063 BREAST TOMOSYNTHESIS BI: CPT | Mod: 26

## 2023-12-08 PROCEDURE — 77063 BREAST TOMOSYNTHESIS BI: CPT

## 2023-12-08 PROCEDURE — 76641 ULTRASOUND BREAST COMPLETE: CPT | Mod: 50

## 2023-12-08 PROCEDURE — 77067 SCR MAMMO BI INCL CAD: CPT

## 2023-12-08 PROCEDURE — 76641 ULTRASOUND BREAST COMPLETE: CPT | Mod: 26,50

## 2023-12-09 DIAGNOSIS — R92.2 INCONCLUSIVE MAMMOGRAM: ICD-10-CM

## 2023-12-09 DIAGNOSIS — Z12.31 ENCOUNTER FOR SCREENING MAMMOGRAM FOR MALIGNANT NEOPLASM OF BREAST: ICD-10-CM

## 2023-12-10 ENCOUNTER — NON-APPOINTMENT (OUTPATIENT)
Age: 73
End: 2023-12-10

## 2023-12-14 ENCOUNTER — APPOINTMENT (OUTPATIENT)
Dept: BREAST CENTER | Facility: CLINIC | Age: 73
End: 2023-12-14
Payer: MEDICARE

## 2023-12-14 VITALS
HEIGHT: 59 IN | SYSTOLIC BLOOD PRESSURE: 123 MMHG | WEIGHT: 156 LBS | DIASTOLIC BLOOD PRESSURE: 63 MMHG | BODY MASS INDEX: 31.45 KG/M2 | HEART RATE: 85 BPM

## 2023-12-14 DIAGNOSIS — C50.919 MALIGNANT NEOPLASM OF UNSPECIFIED SITE OF UNSPECIFIED FEMALE BREAST: ICD-10-CM

## 2023-12-14 DIAGNOSIS — Z98.890 OTHER SPECIFIED POSTPROCEDURAL STATES: ICD-10-CM

## 2023-12-14 PROCEDURE — 99214 OFFICE O/P EST MOD 30 MIN: CPT

## 2023-12-14 NOTE — DATA REVIEWED
[FreeTextEntry1] : CC: [id number]     EXAM:  MG MAMMO SCREEN W YOSI BI#   ORDERED BY: АЛЕКСАНДР POWELL  PROCEDURE DATE:  12/08/2023    INTERPRETATION:  HISTORY: Bilateral MG MAMMO SCREEN W YOSI BI# was performed. Patient is 73 years old and is seen for screening.  The patient has a history of right lumpectomy in June, 2012. The patient has the following family history of breast cancer:  mother, at age 64, breast cancer.  RISK ASSESSMENT: Tyrer-Cuzick Lifetime Risk: 4.1  CLINICAL BREAST EXAM: The patient reports their last clinical breast exam was performed within the past year.  COMPARISON STUDIES: The present examination has been compared to prior imaging studies performed at [facility] on 11/10/2020, 11/18/2021 and 12/01/2022.  MAMMOGRAM FINDINGS: Mammography was performed including the following views: bilateral craniocaudal with tomosynthesis, bilateral mediolateral oblique with tomosynthesis.  The examination includes digital synthetic 2D and digital tomosynthesis 3D images. Additional imaging analysis was performed using CAD (computer-aided detection) software.  There are scattered areas of fibroglandular density.  Finding 1:  There is a stable area of benign architectural distortion corresponding to the site of surgery seen in the right breast.  Finding 2:  There are stable asymmetries seen in both breasts.  No suspicious mass, grouping of calcifications, or other abnormality is identified.  IMPRESSION: There is no mammographic evidence of malignancy.  RECOMMENDATION: Unless otherwise indicated by clinical findings, annual screening mammography recommended.  ASSESSMENT: BI-RADS Category 2:  Benign

## 2023-12-14 NOTE — ASSESSMENT
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  She is s/p whole breast RT and completed arimidex.  She most recently underwent bilateral scg mmg/us in 12/2023 which showed right breast cyst and stable left breast masses (BIRADS 2) with annual screening recommended.  We discussed her cancer history and treatment in detail.  We discussed her most recent imaging and that it was benign,  We spoke about breast cysts. They are not pre-malignant nor do they have malignant potential and are hormonally influenced. They may grow or shrink in size as well as resolve spontaneously and there is usually no intervention unless they are symptomatic. In several large studies, patients with breast cysts and a positive family history had a higher relative risk of breast cancer (from 1.5 to 3).  We also discussed breast density. Increasing breast density has been found to increase ones risk of breast cancer, but at this time, there is no clear indication for additional imaging in this setting, as both US and MRI have not been found to improve survival. One can consider bilateral screening US. However, out of 1000 women screened, the use of routine US will only identify an additional 3-5 cancers. The use of US was found to increase the likelihood of undergoing more imaging and more biopsies. She does not have dense breasts but wishes to continue with screening ultrasounds.  All questions and concerns were answered in detail.  Patient is for bilateral mmg/us in 12/2024.  She is to follow up after imaging, pending any interval changes.  Total time spent on encounter was greater than 30 minutes , which included face to face time with the patient, performing an exam, reviewing previous medical records, reviewing current imaging/ pathology, documenting in patient record and coordinating care/imaging. Greater than 50% of the encounter was spent on counseling and coordination of her breast issue.
Detail Level: Generalized

## 2023-12-14 NOTE — HISTORY OF PRESENT ILLNESS
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   Oncotype DX RS 18.   (+) Whole breast RT.   no chemo, completed Arimidex - Dr. Rosa.  Her history is as follows: s/p US Guided Core Bx - 19: Left 1-2:00 N8, 6mm & Left 1-2:00 N8, 9mm - HYALINIZING FIBROADENOMA.    Most recent imagin22: B/L Screening Mammo and US --> BIRADS 2 -There are scattered areas of fibroglandular density. -There is an area of architectural distortion at the site of lumpectomy seen in the right breast. -At 1-2 o'clock N8 of the left breast there is a stable biopsy proven benign hypoechoic mass measuring 1.0 x 0.8 x 0.3 cm. -At 1-2 o'clock N8 of the left breast there is a stable biopsy proven benign hypoechoic mass measuring 0.6 x 0.5 x 0.3 cm. -Simple appearing cyst is noted in the right breast. -No sonographic evidence of malignancy.   2023 B/L scg mammo and US --> BIRADS 2 -scattered areas of fibroglandular density. -stable area of benign architectural distortion corresponding to the site of surgery seen in the right breast. - stable asymmetries seen in both breasts. -Benign stable masses in the left breast, 1-2 o'clock axis, 8 cm from the nipple measure up to 0.8 cm and 0.6 cm.  -Small benign cyst is seen in the right breast. Rec annual screening  Denies any current complaints. No new changes to her breasts,

## 2023-12-14 NOTE — PHYSICAL EXAM
[Normocephalic] : normocephalic [EOMI] : extra ocular movement intact [No Supraclavicular Adenopathy] : no supraclavicular adenopathy [No Cervical Adenopathy] : no cervical adenopathy [Examined in the supine and seated position] : examined in the supine and seated position [No dominant masses] : no dominant masses in right breast  [No dominant masses] : no dominant masses left breast [No Nipple Retraction] : no left nipple retraction [No Nipple Discharge] : no left nipple discharge [No Axillary Lymphadenopathy] : no left axillary lymphadenopathy [No Rashes] : no rashes [No Ulceration] : no ulceration [de-identified] : Nl respirations

## 2024-02-13 NOTE — REASON FOR VISIT
I have pain [Follow-Up: _____] : a [unfilled] follow-up visit [FreeTextEntry1] : h/o right breast cancer; imaging & pathology review.

## 2024-06-19 NOTE — ASU PREOP CHECKLIST - AS BP NONINV SITE
Relayed to patient but med was called into wrong pharmacy--will reorder to correct pharmacy.   right upper arm

## 2024-12-11 ENCOUNTER — RESULT REVIEW (OUTPATIENT)
Age: 74
End: 2024-12-11

## 2024-12-11 ENCOUNTER — OUTPATIENT (OUTPATIENT)
Dept: OUTPATIENT SERVICES | Facility: HOSPITAL | Age: 74
LOS: 1 days | End: 2024-12-11
Payer: MEDICARE

## 2024-12-11 DIAGNOSIS — Z12.31 ENCOUNTER FOR SCREENING MAMMOGRAM FOR MALIGNANT NEOPLASM OF BREAST: ICD-10-CM

## 2024-12-11 DIAGNOSIS — Z98.890 OTHER SPECIFIED POSTPROCEDURAL STATES: Chronic | ICD-10-CM

## 2024-12-11 DIAGNOSIS — Z96.1 PRESENCE OF INTRAOCULAR LENS: Chronic | ICD-10-CM

## 2024-12-11 DIAGNOSIS — R92.2 INCONCLUSIVE MAMMOGRAM: ICD-10-CM

## 2024-12-11 PROCEDURE — 77063 BREAST TOMOSYNTHESIS BI: CPT

## 2024-12-11 PROCEDURE — 76641 ULTRASOUND BREAST COMPLETE: CPT | Mod: 50

## 2024-12-11 PROCEDURE — 77067 SCR MAMMO BI INCL CAD: CPT | Mod: 26

## 2024-12-11 PROCEDURE — 76641 ULTRASOUND BREAST COMPLETE: CPT | Mod: 26,50

## 2024-12-11 PROCEDURE — 77063 BREAST TOMOSYNTHESIS BI: CPT | Mod: 26

## 2024-12-11 PROCEDURE — 77067 SCR MAMMO BI INCL CAD: CPT

## 2024-12-12 DIAGNOSIS — Z12.31 ENCOUNTER FOR SCREENING MAMMOGRAM FOR MALIGNANT NEOPLASM OF BREAST: ICD-10-CM

## 2024-12-12 DIAGNOSIS — R92.2 INCONCLUSIVE MAMMOGRAM: ICD-10-CM

## 2024-12-18 ENCOUNTER — APPOINTMENT (OUTPATIENT)
Dept: BREAST CENTER | Facility: CLINIC | Age: 74
End: 2024-12-18
Payer: MEDICARE

## 2024-12-18 VITALS
HEART RATE: 89 BPM | WEIGHT: 156 LBS | BODY MASS INDEX: 31.45 KG/M2 | DIASTOLIC BLOOD PRESSURE: 69 MMHG | HEIGHT: 59 IN | SYSTOLIC BLOOD PRESSURE: 140 MMHG

## 2024-12-18 DIAGNOSIS — C50.919 MALIGNANT NEOPLASM OF UNSPECIFIED SITE OF UNSPECIFIED FEMALE BREAST: ICD-10-CM

## 2024-12-18 DIAGNOSIS — Z98.890 OTHER SPECIFIED POSTPROCEDURAL STATES: ICD-10-CM

## 2024-12-18 PROCEDURE — 99214 OFFICE O/P EST MOD 30 MIN: CPT
